# Patient Record
Sex: FEMALE | Race: WHITE | NOT HISPANIC OR LATINO | Employment: UNEMPLOYED | URBAN - METROPOLITAN AREA
[De-identification: names, ages, dates, MRNs, and addresses within clinical notes are randomized per-mention and may not be internally consistent; named-entity substitution may affect disease eponyms.]

---

## 2017-10-17 ENCOUNTER — ALLSCRIPTS OFFICE VISIT (OUTPATIENT)
Dept: OTHER | Facility: OTHER | Age: 62
End: 2017-10-17

## 2017-10-21 NOTE — PROGRESS NOTES
Assessment  1  Encounter for gynecological examination without abnormal finding (V72 31) (Z01 419)    Plan  Encounter for gynecological examination without abnormal finding    · Follow-up visit in 1 year Evaluation and Treatment  Follow-up  Status: Complete  Done:  25UUA1659  Ordered; For: Encounter for gynecological examination without abnormal finding;    Ordered By: Shalonda Briscoe  Performed:   Due: 62CFX2518; Last   Updated By: Minh Schwartz; 10/17/2017 10:43:46 AM  Encounter for screening mammogram for malignant neoplasm of breast    · * MAMMO SCREENING BILATERAL W CAD; Status:Active; Requested SKX:[id number];   Perform:[facility]; ZTQ:20SZZ0565; Last Updated By:Paula Light;   10/17/2017 10:43:46 AM;Ordered; For:Encounter for screening mammogram for   malignant neoplasm of breast; Ordered By:Aniceto Nails; Health Maintenance    · 1 - Barbara Garland (Gastroenterology) Co-Management  *  Status: Active   Requested for: 31RHF3331  Ordered; For: Health Maintenance;  Ordered By: Shalonda Briscoe  Performed:   Due:   33RVW6656  () Care Summary provided  : Yes    Discussion/Summary  healthy adult female the risks and benefits of cervical cancer screening were discussed cervical cancer screening is current Breast cancer screening: the risks and benefits of breast cancer screening were discussed, monthly self breast exam was advised and mammogram has been ordered  Colorectal cancer screening: the risks and benefits of colorectal cancer screening were discussed and colorectal cancer screening is current  Advice and education were given regarding weight bearing exercise, calcium supplements and vitamin D supplements  Chief Complaint  Pt is here for her yearly exam, she has no complaints  History of Present Illness  HPI: 63year old white female here for yearly exam, no complaints  She is postmenopausal and has had no bleeding  She remains on PremPro every 3-4 days with good results   We discussed discontinuing it at this point and seeing how she does  has still never had a colonoscopy  She was strongly urged to schedule this ASAP  GYN HM, Adult Female [facility]: The patient is being seen for a health maintenance evaluation  The last health maintenance visit was 1 year(s) ago  General Health: The patient's health since the last visit is described as good  Lifestyle:  She consumes a diverse and healthy diet  -- She does not have any weight concerns  -- She exercises regularly  She exercises less than three times a week  -- She does not use tobacco -- She consumes alcohol  She reports occasional alcohol use  -- She denies drug use  Reproductive health: the patient is postmenopausal--   she is sexually active  -- pregnancy history: G 2P 2,-- 2    Screening: Cervical cancer screening includes a pap smear performed 10/8/2015,neg  -- and-- human papilloma virus screening performed 10/8/2015,neg  Breast cancer screening includes a mammogram performed 10/24/2016, WNL  Colorectal cancer screening includes no previous colonoscopy  Active Problems  1  Encounter for gynecological examination without abnormal finding (V72 31) (Z01 419)  2  Encounter for screening mammogram for malignant neoplasm of breast (V76 12)   (Z12 31)  3  Menopause (627 2) (Z78 0)    Past Medical History   · History of Encounter for routine gynecological examination (V72 31) (Z01 419)   · History of Hot flashes (627 2) (N95 1)    Surgical History   · History of Foot Surgery   · History of Shoulder Surgery    Family History  Mother    · Family history of malignant neoplasm (V16 9) (Z80 9)  Father    · Family history of cardiac disorder (V17 49) (Z80 55)    Social History   · Currently sexually active   · Daily caffeinated coffee consumption   · Exercises regularly   ·    · Never a smoker   · Social alcohol use (Z78 9)    Current Meds  1  Biotin TABS; Therapy: (Recorded:55Txn5231) to Recorded  2   Multiple Vitamins TABS; Therapy: (Recorded:53Vjb9038) to Recorded  3  Prempro 0 3-1 5 MG Oral Tablet; Take 1 daily; Therapy: 51ZZE6409 to (Last XW:05BBQ6489)  Requested for: 43ZNX1514 Ordered  4  Vitamin B-1 TABS; Therapy: (Recorded:39Xyf4456) to Recorded  5  Vitamin B-12 LIQD;   Therapy: (Recorded:31Yvs3848) to Recorded  6  Vitamin C TABS; Therapy: (Recorded:71Xqc0887) to Recorded  7  Vitamin E 400 UNIT Oral Capsule; Therapy: (Recorded:56Coy4291) to Recorded    Allergies  1  No Known Drug Allergies    Vitals   Recorded: 82VMR6039 08:33JH   Systolic 026, LUE, Sitting   Diastolic 80, LUE, Sitting   Weight 148 lb    BMI Calculated 23 89   BSA Calculated 1 76   LMP      Physical Exam    Constitutional   General appearance: No acute distress, well appearing and well nourished  Neck   Neck: Normal, supple, trachea midline, no masses  Genitourinary   External genitalia: Normal and no lesions appreciated  Vagina: Normal, no lesions or dryness appreciated  Urethra: Normal     Urethral meatus: Normal     Bladder: Normal, soft, non-tender and no prolapse or masses appreciated  Cervix: Normal, no palpable masses  Uterus: Normal, non-tender, not enlarged, and no palpable masses  Adnexa/parametria: Normal, non-tender and no fullness or masses appreciated  Chest   Breasts: Normal and no dimpling or skin changes noted  Abdomen   Abdomen: Normal, non-tender, and no organomegaly noted  Lymphatic   Palpation of lymph nodes in neck, axillae, groin and/or other locations: No lymphadenopathy or masses noted         Future Appointments    Date/Time Provider Specialty Site   10/18/2018 09:20 AM Tara Irwin [facility] Obstetrics/Gynecology [facility] OB     Signatures   Electronically signed by : Noemi Gruber [facility]; Oct 17 2017  9:58AM EST                       (Author)    Electronically signed by : SAMMIE Gregg ; Oct 20 2017 11:06PM EST                       (Author)

## 2017-10-23 ENCOUNTER — GENERIC CONVERSION - ENCOUNTER (OUTPATIENT)
Dept: OTHER | Facility: OTHER | Age: 62
End: 2017-10-23

## 2017-10-24 DIAGNOSIS — Z12.31 ENCOUNTER FOR SCREENING MAMMOGRAM FOR MALIGNANT NEOPLASM OF BREAST: ICD-10-CM

## 2017-10-27 ENCOUNTER — CONVERSION ENCOUNTER (OUTPATIENT)
Dept: RADIOLOGY | Facility: IMAGING CENTER | Age: 62
End: 2017-10-27

## 2017-10-27 ENCOUNTER — GENERIC CONVERSION - ENCOUNTER (OUTPATIENT)
Dept: OTHER | Facility: OTHER | Age: 62
End: 2017-10-27

## 2017-10-30 ENCOUNTER — GENERIC CONVERSION - ENCOUNTER (OUTPATIENT)
Dept: OTHER | Facility: OTHER | Age: 62
End: 2017-10-30

## 2017-12-13 ENCOUNTER — GENERIC CONVERSION - ENCOUNTER (OUTPATIENT)
Dept: OTHER | Facility: OTHER | Age: 62
End: 2017-12-13

## 2018-01-11 NOTE — MISCELLANEOUS
Message  GI Reminder Recall [facility]:   Date: 10/30/2017   Dear Leydi Ma:     Review of our records shows you are due for the following: Colonoscopy  Our records indicate that you are due at this time to have a follow-up examination for a colonoscopy  As you now, these tests are done to prevent colon cancer, a very common disease in the [place] and responsible for the thousands of patient deaths each year  We at [facility] are concerned for your health, and would very much appreciate you getting in touch with us at your earliest convenience, Again, this examination is vital to your proper health maintenance and for the prevention of cancer  Please call the following office to schedule your appointment:   [address], [address], [place], [address] [facility] [phone]  Please call our office to schedule Thank you     We look forward to hearing from you!      Sincerely,         Signatures   Electronically signed by : Alesia Hanson, ; Oct 30 2017 10:39AM EST                       (Author)

## 2018-01-13 VITALS — BODY MASS INDEX: 26.22 KG/M2 | DIASTOLIC BLOOD PRESSURE: 80 MMHG | WEIGHT: 148 LBS | SYSTOLIC BLOOD PRESSURE: 128 MMHG

## 2018-01-13 NOTE — MISCELLANEOUS
Message   Recorded as Task   Date: 06/29/2016 01:51 PM, Created By: Yulia Pereira   Task Name: Med Renewal Request   Assigned To: Mariela Mendez   Regarding Patient: Helen Prader, Status: Active   Comment:    Zoe Cobb - 29 Jun 2016 1:51 PM     TASK CREATED  Caller: Self; Renew Medication; [phone] (Home)  needs refills on Prempro 28-0 3/1 5 sent to [facility]  pt  at [phone]  Marla Monique - 29 Jun 2016 1:56 PM     TASK REASSIGNED: Previously Assigned To Fay Pompa - 29 Jun 2016 1:57 PM     TASK EDITED  [facility] in sept        Active Problems    1  Encounter for gynecological examination without abnormal finding (V72 31) (Z01 419)   2  Encounter for routine gynecological examination (V72 31) (Z01 419)   3  Encounter for screening mammogram for malignant neoplasm of breast (V76 12)   (Z12 31)   4  Menopause (627 2) (Z78 0)   5  Screening for HPV (human papillomavirus) (V73 81) (Z11 51)    Current Meds   1  Biotin TABS; Therapy: (Recorded:73Wpw6200) to Recorded   2  Multiple Vitamins TABS; Therapy: (Recorded:11Sdq8390) to Recorded   3  Prempro 0 3-1 5 MG Oral Tablet; Take 1 daily; Therapy: 42JEU0061 to (Evaluate:84Lus4914)  Requested for: 08SPM3707; Last   Rx:08Jan2016 Ordered   4  Vitamin B-1 TABS; Therapy: (Recorded:07Llz5204) to Recorded   5  Vitamin B-12 LIQD;   Therapy: (Recorded:29Wtt9151) to Recorded   6  Vitamin C TABS; Therapy: (Recorded:40Vbp5651) to Recorded   7  Vitamin E 400 UNIT Oral Capsule; Therapy: (Recorded:45Tdc2992) to Recorded    Allergies    1  No Known Drug Allergies    Plan  Menopause    · Prempro 0 3-1 5 MG Oral Tablet;  Take 1 daily    Signatures   Electronically signed by : Susana German, ; Jun 29 2016  1:58PM EST                       (Author)

## 2018-01-15 NOTE — MISCELLANEOUS
Message  GI Reminder Recall [facility] Lot:   Date: 10/23/2017   Dear Araceli Biswas:     Review of our records shows you are due for the following: Colonoscopy  Our records indicate that you are due at this time to have a follow-up examination for a colonoscopy  As you now, these tests are done to prevent colon cancer, a very common disease in the [place] and responsible for the thousands of patient deaths each year  We at [facility] are concerned for your health, and would very much appreciate you getting in touch with us at your earliest convenience, Again, this examination is vital to your proper health maintenance and for the prevention of cancer  We have attempted to reach you and have been unsuccessful  Please contact our office to schedule your procedure  Thank You       Please call the following office to schedule your appointment:   [address], [address], [place], [address] [facility] [phone]  We look forward to hearing from you!      Sincerely,         Signatures   Electronically signed by : Larisa Subramanian, ; Oct 23 2017  7:32AM EST                       (Author)

## 2018-01-23 NOTE — MISCELLANEOUS
Message   Recorded as Task   Date: 12/12/2017 03:14 PM, Created By: Ethel Murguia   Task Name: Medical Complaint Callback   Assigned To: Faustina Lee   Regarding Patient: Telma Montemayor, Status: In Progress   Shameka Aly - 12 Dec 2017 3:14 PM     TASK CREATED  Caller: Self; Medical Complaint; [phone] (Home); [phone] (Work)  pt stated ever since she has been off her hormone medication she hasn't been been sleeping  and her menopause has gotton worst she would like to know what to do, pt's # [phone] or cell [phone]  Ashley Li - 12 Dec 2017 3:20 PM     TASK IN PROGRESS   Ashley Li - 12 Dec 2017 3:27 PM     TASK EDITED  spoke with pt , since d/c prempro in oct, is absolutely miserable    insomnia, heat flashes   , jumping out of her skin    states she can't function  awaits further instructions from Chance Hinojosa - 12 Dec 2017 3:27 PM     TASK REASSIGNED: Previously Assigned To Priyank Deshpande - 13 Dec 2017 8:11 AM     TASK REPLIED TO: Previously Assigned To Karson French  She had been doing well on one pill every 3-4 days - she can go back on that if she desires   Kaylen Odonnell - 13 Dec 2017 8:53 AM     TASK EDITED  Spoke with pt - she will take the Prempro  Refilled order for her  If patient feels she needs an appointment she will call back to make it  Active Problems    1  Encounter for gynecological examination without abnormal finding (V72 31) (Z01 419)   2  Encounter for screening mammogram for malignant neoplasm of breast (V76 12)   (Z12 31)   3  Menopause (627 2) (Z78 0)    Current Meds   1  Biotin TABS; Therapy: (Recorded:64Xqv4370) to Recorded   2  Multiple Vitamins TABS; Therapy: (Recorded:94Bmm9947) to Recorded   3  Prempro 0 3-1 5 MG Oral Tablet; Take 1 daily; Therapy: 77ECR9585 to (Last HO:37ZPI9997)  Requested for: 79LWF1325 Ordered   4  Vitamin B-1 TABS; Therapy: (Recorded:55Trh8118) to Recorded   5   Vitamin B-12 LIQD;   Therapy: (ALFHNVJT:92ZBZ4178) to Recorded   6  Vitamin C TABS; Therapy: (Recorded:85Bac2565) to Recorded   7  Vitamin E 400 UNIT Oral Capsule; Therapy: (Recorded:99Maj4624) to Recorded    Allergies    1  No Known Drug Allergies    Plan  Menopause    · Prempro 0 3-1 5 MG Oral Tablet;  Take 1 daily    Signatures   Electronically signed by : Paxton Rivera, ; Dec 13 2017  8:53AM EST                       (Author)

## 2018-01-23 NOTE — MISCELLANEOUS
Message   Recorded as Task   Date: 12/12/2017 03:14 PM, Created By: Ford Olguin   Task Name: Medical Complaint Callback   Assigned To: Raquel Horn   Regarding Patient: Myles Garcia, Status: In Progress   CommentUlus Drivers - 12 Dec 2017 3:14 PM     TASK CREATED  Caller: Self; Medical Complaint; [phone] (Home); [phone] (Work)  pt stated ever since she has been off her hormone medication she hasn't been been sleeping  and her menopause has gotton worst she would like to know what to do, pt's # [phone] or cell [phone]  Ashley Li - 12 Dec 2017 3:20 PM     TASK IN PROGRESS   Ashley Li - 12 Dec 2017 3:27 PM     TASK EDITED  spoke with pt , since d/c prempro in oct, is absolutely miserable    insomnia, heat flashes   , jumping out of her skin    states she can't function  awaits further instructions from yadiel        Active Problems    1  Encounter for gynecological examination without abnormal finding (V72 31) (Z01 419)   2  Encounter for screening mammogram for malignant neoplasm of breast (V76 12)   (Z12 31)   3  Menopause (627 2) (Z78 0)    Current Meds   1  Biotin TABS; Therapy: (Recorded:19Kzi8539) to Recorded   2  Multiple Vitamins TABS; Therapy: (Recorded:50Qti5730) to Recorded   3  Prempro 0 3-1 5 MG Oral Tablet; Take 1 daily; Therapy: 65BXX6133 to (Last FV:89UMD9253)  Requested for: 79QAE2686 Ordered   4  Vitamin B-1 TABS; Therapy: (Recorded:79Szt4816) to Recorded   5  Vitamin B-12 LIQD;   Therapy: (Recorded:33Gqn7796) to Recorded   6  Vitamin C TABS; Therapy: (Recorded:25Nel0265) to Recorded   7  Vitamin E 400 UNIT Oral Capsule; Therapy: (Recorded:84Ktb3428) to Recorded    Allergies    1   No Known Drug Allergies    Signatures   Electronically signed by : Timoteo Roger, ; Dec 12 2017  3:27PM EST                       (Author)

## 2018-03-13 ENCOUNTER — TELEPHONE (OUTPATIENT)
Dept: OBGYN CLINIC | Facility: CLINIC | Age: 63
End: 2018-03-13

## 2018-10-09 ENCOUNTER — TELEPHONE (OUTPATIENT)
Dept: OBGYN CLINIC | Facility: CLINIC | Age: 63
End: 2018-10-09

## 2018-10-09 NOTE — TELEPHONE ENCOUNTER
ROSIBEL CALLED REGARDING HER NEW INSURANCE, CARDINAL TATO Devi ON THE INSURANCE CARD IT DOES STATE MULTI-PLAN    SHE WAS WONDERING HOW MUCH AN OFFICE VISIT (YEARLY) WOULD COST IF WE DID NOT TAKE HER INSURANCE, I QUOTED HER BETWEEN [id number]    SHE WAS WONDERING WHAT HER CO-PAY MIGHT BE AND I SAID SHE MIGHT WANT TO CALL HER INSURANCE BUT SHE SAID SHE DID AND THEY TOLD HER TO CALL HER DR OFFICE    SHE HAS HER YEARLY APPOINTMENT WITH [facility] ON 10/18/18  Neri Devi

## 2018-10-18 ENCOUNTER — ANNUAL EXAM (OUTPATIENT)
Dept: OBGYN CLINIC | Facility: CLINIC | Age: 63
End: 2018-10-18
Payer: COMMERCIAL

## 2018-10-18 VITALS
SYSTOLIC BLOOD PRESSURE: 120 MMHG | DIASTOLIC BLOOD PRESSURE: 74 MMHG | WEIGHT: 148 LBS | HEIGHT: 63 IN | BODY MASS INDEX: 26.22 KG/M2

## 2018-10-18 DIAGNOSIS — Z12.31 ENCOUNTER FOR SCREENING MAMMOGRAM FOR MALIGNANT NEOPLASM OF BREAST: ICD-10-CM

## 2018-10-18 DIAGNOSIS — Z01.419 ENCNTR FOR GYN EXAM (GENERAL) (ROUTINE) W/O ABN FINDINGS: ICD-10-CM

## 2018-10-18 PROCEDURE — 99396 PREV VISIT EST AGE 40-64: CPT | Performed by: PHYSICIAN ASSISTANT

## 2018-10-18 RX ORDER — BIOTIN 10 MG
TABLET ORAL
COMMUNITY

## 2018-10-18 NOTE — PROGRESS NOTES
Jodi Dickinson   1955    CC:  Yearly exam    S:  66 y o  female here for yearly exam  She is postmenopausal and has had no vaginal bleeding  She denies vaginal discharge, itching, odor or dryness  She is sexually active  She continues to use PremPro 0 3/1 5 every 4 days  She is going to try cutting it back to every 5th day  She is now retired and remains very busy  She is currently dog-sitting her son's dog  Last Pap 10/8/15 neg/neg  Last Mammo 10/27/17 neg  Last Colonoscopy never - did Cologuard    Current Outpatient Prescriptions:     Biotin 10 MG TABS, Take by mouth, Disp: , Rfl:     estrogen, conjugated,-medroxyprogesterone (PREMPRO) 0 3-1 5 MG per tablet, Take by mouth daily, Disp: , Rfl:     Lactobacillus Acidophilus POWD, Take by mouth, Disp: , Rfl:     Multiple Vitamins-Minerals (PREVENT PO), Take by mouth, Disp: , Rfl:     Omega-3 Fatty Acids (FISH OIL PO), Take 2 g by mouth, Disp: , Rfl:   Social History     Social History    Marital status: /Civil Union     Spouse name: N/A    Number of children: N/A    Years of education: N/A     Occupational History    Not on file  Social History Main Topics    Smoking status: Never Smoker    Smokeless tobacco: Never Used    Alcohol use Yes      Comment: occ    Drug use: No    Sexual activity: Yes     Partners: Male     Other Topics Concern    Not on file     Social History Narrative    No narrative on file     Family History   Problem Relation Age of Onset    Heart disease Mother     Hypertension Mother     Ovarian cancer Mother     Heart attack Father      History reviewed  No pertinent past medical history  O:  Blood pressure 120/74, height 5' 2 6" (1 59 m), weight 67 1 kg (148 lb)  Patient appears well and is not in distress  Neck is supple without masses  Breasts are symmetrical without mass, tenderness, nipple discharge, skin changes or adenopathy  Abdomen is soft and nontender without masses     External genitals are normal without lesions or rashes  Vagina is normal without discharge or bleeding  Cervix is normal without discharge or lesion  Uterus is normal, mobile, nontender without palpable mass  Adnexa are normal, nontender, without palpable mass  A:  Yearly exam      P:   Pap 2020   Mammo slip given   RTO one year for yearly exam or sooner as needed

## 2018-10-31 ENCOUNTER — HOSPITAL ENCOUNTER (OUTPATIENT)
Dept: RADIOLOGY | Facility: IMAGING CENTER | Age: 63
Discharge: HOME | End: 2018-10-31
Payer: COMMERCIAL

## 2018-10-31 DIAGNOSIS — Z12.31 ENCOUNTER FOR SCREENING MAMMOGRAM FOR MALIGNANT NEOPLASM OF BREAST: ICD-10-CM

## 2018-10-31 PROCEDURE — 77067 SCR MAMMO BI INCL CAD: CPT

## 2019-10-29 NOTE — PROGRESS NOTES
Arabella Dickinson   1955    CC:  Yearly exam    S:  60 y o  female here for yearly exam  She is postmenopausal and has had no vaginal bleeding  She denies vaginal discharge, itching, odor or dryness  Sexual activity: She is sexually active without pain, bleeding or dryness  She has terrible hot flashes and night sweats since stopping her PremPro last year when her insurance changed and it started to cost $260/month  She is now using a supplement called Amberen that she found on the internet  She will let me know if it doesn't help in the next few months  Last Pap: 10/8/15 neg/neg  Last Mammo: 10/31/18 neg  Last Colonoscopy: FIT 2018  Last DEXA: never    We reviewed [facility] guidelines for Pap testing       Family hx of breast cancer: no  Family hx of ovarian cancer:  mother  Family hx of colon cancer: no    Current Outpatient Medications:     Biotin 10 MG TABS, Take by mouth, Disp: , Rfl:     Omega-3 Fatty Acids (FISH OIL PO), Take 2 g by mouth, Disp: , Rfl:     vitamin E 100 UNIT capsule, Take 100 Units by mouth daily, Disp: , Rfl:     estrogen, conjugated,-medroxyprogesterone (PREMPRO) 0 3-1 5 MG per tablet, Take by mouth daily, Disp: , Rfl:   Social History     Socioeconomic History    Marital status: /Civil Union     Spouse name: Not on file    Number of children: Not on file    Years of education: Not on file    Highest education level: Not on file   Occupational History    Not on file   Social Needs    Financial resource strain: Not on file    Food insecurity:     Worry: Not on file     Inability: Not on file    Transportation needs:     Medical: Not on file     Non-medical: Not on file   Tobacco Use    Smoking status: Never Smoker    Smokeless tobacco: Never Used   Substance and Sexual Activity    Alcohol use: Yes     Comment: occ    Drug use: No    Sexual activity: Yes     Partners: Male   Lifestyle    Physical activity:     Days per week: Not on file     Minutes per session: Not on file    Stress: Not on file   Relationships    Social connections:     Talks on phone: Not on file     Gets together: Not on file     Attends Orthodoxy service: Not on file     Active member of club or organization: Not on file     Attends meetings of clubs or organizations: Not on file     Relationship status: Not on file    Intimate partner violence:     Fear of current or ex partner: Not on file     Emotionally abused: Not on file     Physically abused: Not on file     Forced sexual activity: Not on file   Other Topics Concern    Not on file   Social History Narrative    Not on file     Family History   Problem Relation Age of Onset    Heart disease Mother     Hypertension Mother     Ovarian cancer Mother     Heart attack Father      History reviewed  No pertinent past medical history  Review of Systems   Respiratory: Negative  Cardiovascular: Negative  Gastrointestinal: Negative for constipation and diarrhea  Genitourinary: Negative for difficulty urinating, pelvic pain, vaginal bleeding, vaginal discharge, itching or odor  O:  Blood pressure 110/72, height 5' 2 21" (1 58 m), weight 66 2 kg (146 lb)  Patient appears well and is not in distress  Neck is supple without masses  Breasts are symmetrical without mass, tenderness, nipple discharge, skin changes or adenopathy  Abdomen is soft and nontender without masses  External genitals are normal without lesions or rashes  Urethral meatus and urethra are normal  Bladder is normal to palpation  Vagina is normal without discharge or bleeding  Cervix is normal without discharge or lesion  Uterus is normal, mobile, nontender without palpable mass  Adnexa are normal, nontender, without palpable mass  A:  Yearly exam      P:   Pap and HPV today   Mammo slip given   Colon cancer screening due now    RTO one year for yearly exam or sooner as needed

## 2019-10-30 ENCOUNTER — ANNUAL EXAM (OUTPATIENT)
Dept: OBGYN CLINIC | Facility: CLINIC | Age: 64
End: 2019-10-30
Payer: COMMERCIAL

## 2019-10-30 VITALS
SYSTOLIC BLOOD PRESSURE: 110 MMHG | WEIGHT: 146 LBS | DIASTOLIC BLOOD PRESSURE: 72 MMHG | BODY MASS INDEX: 26.87 KG/M2 | HEIGHT: 62 IN

## 2019-10-30 DIAGNOSIS — Z12.31 ENCOUNTER FOR SCREENING MAMMOGRAM FOR MALIGNANT NEOPLASM OF BREAST: ICD-10-CM

## 2019-10-30 DIAGNOSIS — Z01.419 ENCNTR FOR GYN EXAM (GENERAL) (ROUTINE) W/O ABN FINDINGS: ICD-10-CM

## 2019-10-30 PROCEDURE — 99396 PREV VISIT EST AGE 40-64: CPT | Performed by: PHYSICIAN ASSISTANT

## 2019-11-12 ENCOUNTER — HOSPITAL ENCOUNTER (OUTPATIENT)
Dept: RADIOLOGY | Facility: IMAGING CENTER | Age: 64
Discharge: HOME | End: 2019-11-12
Payer: COMMERCIAL

## 2019-11-12 VITALS — WEIGHT: 146 LBS | BODY MASS INDEX: 25.87 KG/M2 | HEIGHT: 63 IN

## 2019-11-12 DIAGNOSIS — Z12.31 ENCOUNTER FOR SCREENING MAMMOGRAM FOR MALIGNANT NEOPLASM OF BREAST: ICD-10-CM

## 2019-11-12 PROCEDURE — 77067 SCR MAMMO BI INCL CAD: CPT

## 2020-10-26 ENCOUNTER — TRANSCRIBE ORDERS (OUTPATIENT)
Dept: ADMINISTRATIVE | Facility: HOSPITAL | Age: 65
End: 2020-10-26

## 2020-10-26 DIAGNOSIS — Z12.31 ENCOUNTER FOR SCREENING MAMMOGRAM FOR MALIGNANT NEOPLASM OF BREAST: Primary | ICD-10-CM

## 2020-11-04 ENCOUNTER — ANNUAL EXAM (OUTPATIENT)
Dept: OBGYN CLINIC | Facility: CLINIC | Age: 65
End: 2020-11-04
Payer: COMMERCIAL

## 2020-11-04 VITALS
DIASTOLIC BLOOD PRESSURE: 80 MMHG | WEIGHT: 155 LBS | TEMPERATURE: 98.7 F | HEIGHT: 63 IN | SYSTOLIC BLOOD PRESSURE: 128 MMHG | BODY MASS INDEX: 27.46 KG/M2

## 2020-11-04 DIAGNOSIS — Z01.419 ENCNTR FOR GYN EXAM (GENERAL) (ROUTINE) W/O ABN FINDINGS: ICD-10-CM

## 2020-11-04 DIAGNOSIS — Z12.31 ENCOUNTER FOR SCREENING MAMMOGRAM FOR MALIGNANT NEOPLASM OF BREAST: ICD-10-CM

## 2020-11-04 PROCEDURE — 99396 PREV VISIT EST AGE 40-64: CPT | Performed by: PHYSICIAN ASSISTANT

## 2020-11-04 PROCEDURE — G0145 SCR C/V CYTO,THINLAYER,RESCR: HCPCS | Performed by: PHYSICIAN ASSISTANT

## 2020-11-04 PROCEDURE — 87624 HPV HI-RISK TYP POOLED RSLT: CPT | Performed by: PHYSICIAN ASSISTANT

## 2020-11-04 RX ORDER — ZINC GLUCONATE 50 MG
50 TABLET ORAL DAILY
COMMUNITY

## 2020-11-05 LAB
HPV HR 12 DNA CVX QL NAA+PROBE: NEGATIVE
HPV16 DNA CVX QL NAA+PROBE: NEGATIVE
HPV18 DNA CVX QL NAA+PROBE: NEGATIVE

## 2020-11-10 LAB
LAB AP GYN PRIMARY INTERPRETATION: NORMAL
Lab: NORMAL

## 2020-11-13 ENCOUNTER — HOSPITAL ENCOUNTER (OUTPATIENT)
Dept: RADIOLOGY | Facility: IMAGING CENTER | Age: 65
Discharge: HOME | End: 2020-11-13
Payer: COMMERCIAL

## 2020-11-13 VITALS — BODY MASS INDEX: 28.52 KG/M2 | WEIGHT: 155 LBS | HEIGHT: 62 IN

## 2020-11-13 DIAGNOSIS — Z12.31 ENCOUNTER FOR SCREENING MAMMOGRAM FOR MALIGNANT NEOPLASM OF BREAST: ICD-10-CM

## 2020-11-13 PROCEDURE — 77067 SCR MAMMO BI INCL CAD: CPT

## 2021-04-13 DIAGNOSIS — Z23 ENCOUNTER FOR IMMUNIZATION: ICD-10-CM

## 2021-11-26 ENCOUNTER — OFFICE VISIT (OUTPATIENT)
Dept: URGENT CARE | Age: 66
End: 2021-11-26
Payer: COMMERCIAL

## 2021-11-26 VITALS
TEMPERATURE: 98.1 F | RESPIRATION RATE: 18 BRPM | HEART RATE: 103 BPM | WEIGHT: 150 LBS | OXYGEN SATURATION: 95 % | HEIGHT: 63 IN | BODY MASS INDEX: 26.58 KG/M2

## 2021-11-26 DIAGNOSIS — J34.89 SINUS PRESSURE: Primary | ICD-10-CM

## 2021-11-26 PROCEDURE — U0005 INFEC AGEN DETEC AMPLI PROBE: HCPCS | Performed by: PREVENTIVE MEDICINE

## 2021-11-26 PROCEDURE — U0003 INFECTIOUS AGENT DETECTION BY NUCLEIC ACID (DNA OR RNA); SEVERE ACUTE RESPIRATORY SYNDROME CORONAVIRUS 2 (SARS-COV-2) (CORONAVIRUS DISEASE [COVID-19]), AMPLIFIED PROBE TECHNIQUE, MAKING USE OF HIGH THROUGHPUT TECHNOLOGIES AS DESCRIBED BY CMS-2020-01-R: HCPCS | Performed by: PREVENTIVE MEDICINE

## 2021-11-26 PROCEDURE — 99213 OFFICE O/P EST LOW 20 MIN: CPT | Performed by: PREVENTIVE MEDICINE

## 2021-11-26 RX ORDER — AZITHROMYCIN 250 MG/1
TABLET, FILM COATED ORAL
Qty: 6 TABLET | Refills: 0 | Status: SHIPPED | OUTPATIENT
Start: 2021-11-26 | End: 2021-11-30

## 2021-11-26 RX ORDER — GENTAMICIN SULFATE 3 MG/ML
1 SOLUTION/ DROPS OPHTHALMIC EVERY 4 HOURS
Qty: 5 ML | Refills: 0 | Status: SHIPPED | OUTPATIENT
Start: 2021-11-26

## 2021-11-26 RX ORDER — GENTAMICIN SULFATE 3 MG/ML
1 SOLUTION/ DROPS OPHTHALMIC EVERY 4 HOURS
Qty: 5 ML | Refills: 0 | Status: SHIPPED | OUTPATIENT
Start: 2021-11-26 | End: 2021-11-26

## 2021-11-26 RX ORDER — AZITHROMYCIN 250 MG/1
TABLET, FILM COATED ORAL
Qty: 6 TABLET | Refills: 0 | Status: SHIPPED | OUTPATIENT
Start: 2021-11-26 | End: 2021-11-26

## 2021-11-27 LAB — SARS-COV-2 RNA RESP QL NAA+PROBE: NEGATIVE

## 2022-10-04 ENCOUNTER — APPOINTMENT (OUTPATIENT)
Dept: RADIOLOGY | Age: 67
End: 2022-10-04
Payer: COMMERCIAL

## 2022-10-04 ENCOUNTER — OFFICE VISIT (OUTPATIENT)
Dept: URGENT CARE | Age: 67
End: 2022-10-04
Payer: COMMERCIAL

## 2022-10-04 VITALS
OXYGEN SATURATION: 98 % | TEMPERATURE: 96.7 F | HEART RATE: 79 BPM | SYSTOLIC BLOOD PRESSURE: 118 MMHG | RESPIRATION RATE: 18 BRPM | DIASTOLIC BLOOD PRESSURE: 74 MMHG

## 2022-10-04 DIAGNOSIS — M79.672 LEFT FOOT PAIN: Primary | ICD-10-CM

## 2022-10-04 DIAGNOSIS — M79.672 LEFT FOOT PAIN: ICD-10-CM

## 2022-10-04 PROCEDURE — 73630 X-RAY EXAM OF FOOT: CPT

## 2022-10-04 PROCEDURE — 99213 OFFICE O/P EST LOW 20 MIN: CPT

## 2022-10-04 NOTE — PROGRESS NOTES
[address] [facility]        NAME: Laura Wilkes is a 74 y o  female  : 1955    MRN: [id number]  DATE: 2022  TIME: 10:44 AM    Assessment and Plan   Left foot pain [M79 672]  1  Left foot pain  XR foot 3+ vw left         Patient Instructions     XR prelim reading: no acute fractures  Degenerative changes 1st MTP joint  Recommend NSAID  Follow up with Podiatry or PCP  Established with podiatry from bunionectomy of R foot  Follow up with PCP in 2-3 days  Proceed to ER if symptoms worsen  Chief Complaint     Chief Complaint   Patient presents with    Foot Pain     Left foot pains primarily at night  Unable to move toes  Unable to lift toes  Pain occasionally during the day, but mostly at night  History of Present Illness     74 y o  F presents with complaint of L foot pain x 1 month  Denies trauma or injury  Denies prior surgery  States pain is worse at night  Has not taken anything OTC for pain  Denies numbness or tingling  Requesting XR  Review of Systems   Review of Systems   Constitutional: Negative for chills, fatigue and fever  HENT: Negative for congestion, ear pain, facial swelling, hearing loss, rhinorrhea, sinus pressure, sneezing, sore throat and trouble swallowing  Eyes: Negative for pain, redness and visual disturbance  Respiratory: Negative for cough, chest tightness, shortness of breath and wheezing  Cardiovascular: Negative for chest pain and palpitations  Gastrointestinal: Negative for abdominal pain, diarrhea, nausea and vomiting  Genitourinary: Negative for dysuria, flank pain, hematuria and pelvic pain  Musculoskeletal: Positive for arthralgias  Negative for back pain, joint swelling, myalgias, neck pain and neck stiffness  Skin: Negative for color change, rash and wound  Neurological: Negative for dizziness, seizures, syncope, weakness, light-headedness, numbness and headaches     Psychiatric/Behavioral: Negative for confusion, hallucinations and sleep disturbance  The patient is not nervous/anxious  All other systems reviewed and are negative  Current Medications       Current Outpatient Medications:     Biotin 10 MG TABS, Take by mouth, Disp: , Rfl:     gentamicin (GARAMYCIN) 0 3 % ophthalmic solution, Administer 1 drop to the right eye every 4 (four) hours, Disp: 5 mL, Rfl: 0    Nutritional Supplements (VITAMIN D BOOSTER PO), Take by mouth, Disp: , Rfl:     Omega-3 Fatty Acids (FISH OIL PO), Take 2 g by mouth, Disp: , Rfl:     vitamin E 100 UNIT capsule, Take 100 Units by mouth daily, Disp: , Rfl:     zinc gluconate 50 mg tablet, Take 50 mg by mouth daily, Disp: , Rfl:     Current Allergies     Allergies as of 10/04/2022    (No Known Allergies)            The following portions of the patient's history were reviewed and updated as appropriate: allergies, current medications, past family history, past medical history, past social history, past surgical history and problem list      History reviewed  No pertinent past medical history  Past Surgical History:   Procedure Laterality Date    FOOT SURGERY      SHOULDER SURGERY         Family History   Problem Relation Age of Onset    Heart disease Mother     Hypertension Mother     Ovarian cancer Mother     Heart attack Father     No Known Problems Sister     No Known Problems Daughter     No Known Problems Maternal Grandmother     No Known Problems Maternal Grandfather     No Known Problems Paternal Grandmother     No Known Problems Paternal Grandfather     No Known Problems Sister     No Known Problems Paternal Aunt     No Known Problems Paternal Aunt     No Known Problems Paternal Aunt     No Known Problems Son          Medications have been verified  Objective   /74   Pulse 79   Temp (!) 96 7 °F (35 9 °C)   Resp 18   SpO2 98%   No LMP recorded  Patient is postmenopausal        Physical Exam     Physical Exam  Vitals reviewed  Constitutional:       General: She is not in acute distress  Appearance: Normal appearance  She is not toxic-appearing  HENT:      Head: Normocephalic  Right Ear: Tympanic membrane normal  Tympanic membrane is not erythematous or bulging  Left Ear: Tympanic membrane normal  Tympanic membrane is not erythematous or bulging  Nose: No congestion or rhinorrhea  Right Sinus: No maxillary sinus tenderness or frontal sinus tenderness  Left Sinus: No maxillary sinus tenderness or frontal sinus tenderness  Mouth/Throat:      Pharynx: Uvula midline  No oropharyngeal exudate, posterior oropharyngeal erythema or uvula swelling  Tonsils: No tonsillar exudate or tonsillar abscesses  Eyes:      Extraocular Movements: Extraocular movements intact  Conjunctiva/sclera: Conjunctivae normal       Pupils: Pupils are equal, round, and reactive to light  Cardiovascular:      Rate and Rhythm: Normal rate and regular rhythm  Pulses: Normal pulses  Heart sounds: Normal heart sounds  Pulmonary:      Effort: Pulmonary effort is normal  No tachypnea or respiratory distress  Breath sounds: Normal breath sounds and air entry  No decreased breath sounds, wheezing, rhonchi or rales  Abdominal:      General: Bowel sounds are normal       Palpations: Abdomen is soft  Tenderness: There is no abdominal tenderness  There is no right CVA tenderness or guarding  Musculoskeletal:         General: Normal range of motion  Cervical back: Normal range of motion  Comments:   Normal gait & station  No tenderness on exam - unable to reproduce pain  FAROM  Neurovasc intact   Lymphadenopathy:      Cervical: No cervical adenopathy  Skin:     General: Skin is warm and dry  Neurological:      General: No focal deficit present  Mental Status: She is alert  Cranial Nerves: Cranial nerves are intact  Sensory: Sensation is intact        Motor: Motor function is intact  Coordination: Coordination is intact  Gait: Gait is intact  Deep Tendon Reflexes: Reflexes are normal and symmetric

## 2023-03-30 ENCOUNTER — EVALUATION (OUTPATIENT)
Dept: PHYSICAL THERAPY | Facility: REHABILITATION | Age: 68
End: 2023-03-30

## 2023-03-30 DIAGNOSIS — Z98.890 S/P COMPLETE REPAIR OF ROTATOR CUFF: ICD-10-CM

## 2023-03-30 DIAGNOSIS — M25.511 ACUTE PAIN OF RIGHT SHOULDER: Primary | ICD-10-CM

## 2023-03-30 RX ORDER — METHOCARBAMOL 500 MG/1
500 TABLET, FILM COATED ORAL 4 TIMES DAILY
COMMUNITY

## 2023-03-30 NOTE — LETTER
2023    Wilmer Ballard MD  [facility]    Patient: Dewayne Vincent   YOB: 1955   Date of Visit: 3/30/2023     Encounter Diagnosis     ICD-10-CM    1  Acute pain of right shoulder  M25 511       2  S/P complete repair of rotator cuff  Z98 890           Dear Dr Kristie Szymanski: Thank you for your recent referral of Dewayne Vincent  Please review the attached evaluation summary from Mily's recent visit  Please verify that you agree with the plan of care by signing the attached order  If you have any questions or concerns, please do not hesitate to call  I sincerely appreciate the opportunity to share in the care of one of your patients and hope to have another opportunity to work with you in the near future  Sincerely,    Wilda Newton, PT      Referring Provider:      I certify that I have read the below Plan of Care and certify the need for these services furnished under this plan of treatment while under my care  Wilmer Ballard MD  [facility] [phone] [id number]  Via Fax: [phone]          PT Evaluation     Today's date: 3/30/2023  Patient name: Dewayne Vincent  : 1955  MRN: [id number]  Referring provider: Riley Mcpherson MD  Dx:   Encounter Diagnosis     ICD-10-CM    1  Acute pain of right shoulder  M25 511       2  S/P complete repair of rotator cuff  Z98 890                      Assessment  Assessment details: Pt is a 74year old right-handed female presenting to PT approximately 6 weeks s/p L shoulder arthroscopy, SAD , RC repair, debride labrum/ synovium/ chondroplasty, release biceps  Pt presents with pain, decreased range of motion, decreased strength, and decreased tolerance to activity  Pt educated in post-operative protocol, self-hygiene, safety precautions, and given HEP  Pt would benefit from skilled physical therapy to address limitations and to achieve goals  Thank you for this referral    Impairments: abnormal coordination, abnormal or restricted ROM, activity intolerance, impaired physical strength and pain with function  Understanding of Dx/Px/POC: good   Prognosis: good    Goals  Short-term goals  1  Pt will decrease pain by 1-2 points within 4 weeks  2  Pt will improve ROM to benchmarks as outlined in surgeon's protocol within 4-6 weeks  3  Pt will be independent in donning/doffing sling within 4 weeks  Long-term goals  1  Pt will be independent in HEP by discharge  2  Pt will be able to reach to shoulder-height shelf with minimal discomfort or scapular compensation in 8 weeks  3  Pt will be able to perform IADLS without pain or compensation in 10 weeks  4  Pt will be able to carry household items/grocery bags with <3/10 pain and without compensation in 12 weeks  Plan  Plan details: Advancement of treatment  Patient would benefit from: PT eval and skilled PT  Planned modality interventions: cryotherapy  Planned therapy interventions: IADL retraining, body mechanics training, flexibility, functional ROM exercises, home exercise program, neuromuscular re-education, manual therapy, postural training, strengthening, stretching, therapeutic activities, therapeutic exercise, activity modification, patient education and self care  Frequency: 2x week  Duration in visits: 20  Duration in weeks: 12        Subjective Evaluation    History of Present Illness  Date of surgery: 2/17/2023  Mechanism of injury: surgery  Mechanism of injury: Pt is a 74year old right-handed female presenting to PT approximately 6 weeks s/p L shoulder arthroscopy, SAD , RC repair, debride labrum/ synovium/ chondroplasty, release biceps  Pt reports she does not recall any specific injury, however, began having pain and difficulty lifting her arm in January  Pt went to Dr Tatiana Roy for further evaluation, x-ray and MRI performed, positive for RTC tear   Pt underwent surgical procedure as "listed above on 2/17/23  Pt placed in standard sling, without abduction pillow  Pt referred to OPPT  Pt is retired, used to work at [employer] as [employer]  Pain Location: left shoulder and upper arm region    Pain Type: constant throbbing, sharp pain with movement    Pain Intensity:  Current: 8  Best: 5  Worst: 8      Pt reports increased pain and/or difficulty with: ADLs, any movement with her left arm  Pt reports she has difficulty falling and staying asleep, states \"I haven't slept for a month  \"    Pt reports decreased pain with: medication, ice            Recurrent probem    Quality of life: good    Treatments  Current treatment: medication  Patient Goals  Patient goals for therapy: decreased pain, increased motion, increased strength and independence with ADLs/IADLs          Objective     Passive Range of Motion   Left Shoulder   Flexion: 75 degrees with pain  Abduction: 65 degrees with pain    Right Shoulder   Normal passive range of motion    Left Elbow   Normal passive range of motion    Right Elbow   Normal passive range of motion    Strength/Myotome Testing     Additional Strength Details  L Shoulder Strength not tested secondary to post-op status and protocol        Flowsheet Rows    Flowsheet Row Most Recent Value   PT/OT G-Codes    Current Score 42   Projected Score 71            Precautions: RTC Protocol    Patient's Goals:      Daily Treatment Diary      Assessment  3/30                     Maryal/Tasia RICHARD                     FOTO  42/71       **         **   Manuals    L Shoulder PROM Per Protocol                                                 Prescribed POC    Pt Education: Protocol, Sling Position, Activity Modification/Restriction, Cryotherapy, Protocol  MD                      HEP Issued/Updated  MD                      Elbow PROM: Flex/Ext/Pro/Sup  1x10 ea                      Pendulums: 4 Way  1x10 ea                      Table Slides: Flex and Scap  NV                      " Putty                         Scap Retract                                                                                                Goal Oriented Functional Activity:                                                Modalities    CP L Shoulder   Post-Tx                                    QR Code:    [facility]:

## 2023-03-30 NOTE — PROGRESS NOTES
PT Evaluation     Today's date: 3/30/2023  Patient name: Radha Lyons  : 1955  MRN: [id number]  Referring provider: Jerome Stevens MD  Dx:   Encounter Diagnosis     ICD-10-CM    1  Acute pain of right shoulder  M25 511       2  S/P complete repair of rotator cuff  Z98 890                      Assessment  Assessment details: Pt is a 74year old right-handed female presenting to PT approximately 6 weeks s/p L shoulder arthroscopy, SAD , RC repair, debride labrum/ synovium/ chondroplasty, release biceps  Pt presents with pain, decreased range of motion, decreased strength, and decreased tolerance to activity  Pt educated in post-operative protocol, self-hygiene, safety precautions, and given HEP  Pt would benefit from skilled physical therapy to address limitations and to achieve goals  Thank you for this referral    Impairments: abnormal coordination, abnormal or restricted ROM, activity intolerance, impaired physical strength and pain with function  Understanding of Dx/Px/POC: good   Prognosis: good    Goals  Short-term goals  1  Pt will decrease pain by 1-2 points within 4 weeks  2  Pt will improve ROM to benchmarks as outlined in surgeon's protocol within 4-6 weeks  3  Pt will be independent in donning/doffing sling within 4 weeks  Long-term goals  1  Pt will be independent in HEP by discharge  2  Pt will be able to reach to shoulder-height shelf with minimal discomfort or scapular compensation in 8 weeks  3  Pt will be able to perform IADLS without pain or compensation in 10 weeks  4  Pt will be able to carry household items/grocery bags with <3/10 pain and without compensation in 12 weeks         Plan  Plan details: Advancement of treatment  Patient would benefit from: PT eval and skilled PT  Planned modality interventions: cryotherapy  Planned therapy interventions: IADL retraining, body mechanics training, flexibility, functional ROM exercises, home exercise program, neuromuscular "re-education, manual therapy, postural training, strengthening, stretching, therapeutic activities, therapeutic exercise, activity modification, patient education and self care  Frequency: 2x week  Duration in visits: 20  Duration in weeks: 12        Subjective Evaluation    History of Present Illness  Date of surgery: 2/17/2023  Mechanism of injury: surgery  Mechanism of injury: Pt is a 74year old right-handed female presenting to PT approximately 6 weeks s/p L shoulder arthroscopy, SAD , RC repair, debride labrum/ synovium/ chondroplasty, release biceps  Pt reports she does not recall any specific injury, however, began having pain and difficulty lifting her arm in January  Pt went to Dr Ro Mora for further evaluation, x-ray and MRI performed, positive for RTC tear  Pt underwent surgical procedure as listed above on 2/17/23  Pt placed in standard sling, without abduction pillow  Pt referred to OPPT  Pt is retired, used to work at [employer] as [employer]  Pain Location: left shoulder and upper arm region    Pain Type: constant throbbing, sharp pain with movement    Pain Intensity:  Current: 8  Best: 5  Worst: 8      Pt reports increased pain and/or difficulty with: ADLs, any movement with her left arm  Pt reports she has difficulty falling and staying asleep, states \"I haven't slept for a month  \"    Pt reports decreased pain with: medication, ice            Recurrent probem    Quality of life: good    Treatments  Current treatment: medication  Patient Goals  Patient goals for therapy: decreased pain, increased motion, increased strength and independence with ADLs/IADLs          Objective     Passive Range of Motion   Left Shoulder   Flexion: 75 degrees with pain  Abduction: 65 degrees with pain    Right Shoulder   Normal passive range of motion    Left Elbow   Normal passive range of motion    Right Elbow   Normal passive range of motion    Strength/Myotome Testing     Additional Strength " Details  L Shoulder Strength not tested secondary to post-op status and protocol        Flowsheet Rows    Flowsheet Row Most Recent Value   PT/OT G-Codes    Current Score 42   Projected Score 71             Precautions: RTC Protocol    Patient's Goals:      Daily Treatment Diary      Assessment  3/30                     Maryal/Tasia RICHARD                     FOTO  42/71       **         **   Manuals    L Shoulder PROM Per Protocol                                                 Prescribed POC    Pt Education: Protocol, Sling Position, Activity Modification/Restriction, Cryotherapy, Protocol  MD                      HEP Issued/Updated  MD                      Elbow PROM: Flex/Ext/Pro/Sup  1x10 ea                      Pendulums: 4 Way  1x10 ea                      Table Slides: Flex and Scap  NV                      Putty                         Scap Retract                                                                                                Goal Oriented Functional Activity:                                                Modalities    CP L Shoulder   Post-Tx                                    QR Code:    [facility] HEP:

## 2023-04-07 ENCOUNTER — OFFICE VISIT (OUTPATIENT)
Dept: PHYSICAL THERAPY | Facility: REHABILITATION | Age: 68
End: 2023-04-07

## 2023-04-07 DIAGNOSIS — M25.511 ACUTE PAIN OF RIGHT SHOULDER: Primary | ICD-10-CM

## 2023-04-07 DIAGNOSIS — Z98.890 S/P COMPLETE REPAIR OF ROTATOR CUFF: ICD-10-CM

## 2023-04-28 ENCOUNTER — OFFICE VISIT (OUTPATIENT)
Dept: PHYSICAL THERAPY | Facility: REHABILITATION | Age: 68
End: 2023-04-28

## 2023-04-28 DIAGNOSIS — M25.511 ACUTE PAIN OF RIGHT SHOULDER: Primary | ICD-10-CM

## 2023-04-28 DIAGNOSIS — Z98.890 S/P COMPLETE REPAIR OF ROTATOR CUFF: ICD-10-CM

## 2023-04-28 NOTE — PROGRESS NOTES
"Daily Note     Today's date: 2023  Patient name: Melony Ghotra  : 1955  MRN: [id number]  Referring provider: Corrinne Roup, MD  Dx:   Encounter Diagnosis     ICD-10-CM    1  Acute pain of right shoulder  M25 511       2  S/P complete repair of rotator cuff  Z98 890                      Subjective: Pt reports that she has been sleeping better but continues with constant 3/10 left anterior shoulder pain  She f/u with MD on 5/10/23  Objective: See treatment diary below  Assessment: Pt with good tolerance to manual techniques with improved mobility with completion  Pt required moderate verbal and manual cues to ensure relaxation with table slides  Will continue to progress program per protocol  Pt will benefit from continued skilled PT intervention in order to address her remaining limitations and to restore maximal function  Plan: Progress treament per protocol        Precautions: RTC Protocol    DOS: 2023     MD Visit: 5/10/23     Daily Treatment Diary      Assessment  3/30  4/7  4/14  4/28               Radha/Tasia RICHARD                     FOTO  42/71       **         **   Manuals    L Shoulder PROM Per Protocol    Akiko Breen MD                L GH Mobs: Dist, Inf    Gr 1-2  MD                      Prescribed POC    Pt Education: Protocol, Sling Position, Activity Modification/Restriction, Cryotherapy, Protocol  MD                      HEP Issued/Updated  MD                      Elbow PROM: Flex/Ext/Pro/Sup  1x10 ea    1x10 ea  HEP                Pendulums: 4 Way  1x10 ea  15x ea  15 ea  x 30 ea                Table Slides: Flex and Scap  NV  10x5\"  10x5\"  10\"x10 ea               Table ER Stretch    10\"x10          Putty     Red 2'  Red 2'  HEP                Scap Retract    10x  2x10  5\"  2x10                Shoulder Isometrics: Flex/ABD/Ext        NV                Supine Wand Press Up        NV                Supine Wand Flexion        NV                S/L ER     NV          Goal " Oriented Functional Activity:                                                Modalities    CP L Shoulder   Post-Tx    pt defers  pt defer  @ home                            QR Code:    [facility]P:

## 2023-05-04 ENCOUNTER — OFFICE VISIT (OUTPATIENT)
Dept: PHYSICAL THERAPY | Facility: REHABILITATION | Age: 68
End: 2023-05-04

## 2023-05-04 DIAGNOSIS — M25.511 ACUTE PAIN OF RIGHT SHOULDER: Primary | ICD-10-CM

## 2023-05-04 DIAGNOSIS — Z98.890 S/P COMPLETE REPAIR OF ROTATOR CUFF: ICD-10-CM

## 2023-05-04 NOTE — PROGRESS NOTES
"Daily Note     Today's date: 2023  Patient name: Eder Hurt  : 1955  MRN: [id number]  Referring provider: Suyapa Vera MD  Dx:   Encounter Diagnosis     ICD-10-CM    1  Acute pain of right shoulder  M25 511       2  S/P complete repair of rotator cuff  Z98 890                      Subjective: Pt reports that she has been doing her exercises every other day, has been very busy with other things  She will start to come twice weekly beginning next week  She has f/u with MD on 5/10/23  Objective: See treatment diary below  Assessment: Pt with good tolerance to manual techniques with improved mobility with completion  Pt with good response to progression of program as listed with appropriate challenge and fatigue  Pt required moderate verbal and manual cues to ensure relaxation with table slides  Will continue to progress program per protocol  Pt will benefit from continued skilled PT intervention in order to address her remaining limitations and to restore maximal function  Plan: Progress treament per protocol        Precautions: RTC Protocol    DOS: 2023     MD Visit: 5/10/23     Daily Treatment Diary      Assessment  3/30  4/7  4/14  4/28  5/4             Radha/Tasia RICHARD                     FOTO  42/71       **         **   Manuals    L Shoulder PROM Per Protocol    Maria Fernanda Gardner MD, MD              L GH Mobs: Dist, Inf    Gr 1-2  MD Gr 1-2  MD                     Prescribed POC    Pt Education: Protocol, Sling Position, Activity Modification/Restriction, Cryotherapy, Protocol  MD                      HEP Issued/Updated  MD                      Elbow PROM: Flex/Ext/Pro/Sup  1x10 ea    1x10 ea  HEP                Pendulums: 4 Way  1x10 ea  15x ea  15 ea  x 30 ea  x 30 ea  HEP            Table Slides: Flex and Scap  NV  10x5\"  10x5\"  10\"x10 ea  10\"x10 ea             Table ER Stretch    10\"x10  10\"x10         Putty     Red 2'  Red 2'  HEP                Scap Retract    10x  2x10  " "5\"  2x10  5\"  2x10             Flexion Wall Slides     R UE Assist  5\" x 5         Shoulder Isometrics: Flex/ABD/Ext        NV  5\"  1x10 ea              Supine Wand Press Up        NV  AAROM w/PT Assist  5\" x 5              Supine Wand Flexion        NV  attempted, not ready              S/L ER     NV  5\"  1x10         Goal Oriented Functional Activity:                                                Modalities    CP L Shoulder   Post-Tx    pt defers  pt defer  @ home  @ home                          QR Code:    [facility] HEP:  Access Code: [email or web address]  URL: [email or web address]  Date: 05/04/2023  Prepared by: Teri Harrell    Exercises  - Isometric Shoulder Flexion at Wall  - 1 x daily - 10 reps - 5 hold  - Standing Isometric Shoulder Abduction with Doorway - Arm Bent  - 1 x daily - 10 reps - 5 hold  - Isometric Shoulder Extension at Wall  - 1 x daily - 10 reps - 5 hold  - Shoulder Flexion Wall Slide with Towel  - 1 x daily - 10 reps - 5 hold     "

## 2023-05-05 ENCOUNTER — APPOINTMENT (OUTPATIENT)
Dept: PHYSICAL THERAPY | Facility: REHABILITATION | Age: 68
End: 2023-05-05
Payer: COMMERCIAL

## 2023-05-11 ENCOUNTER — APPOINTMENT (OUTPATIENT)
Dept: PHYSICAL THERAPY | Facility: REHABILITATION | Age: 68
End: 2023-05-11
Payer: COMMERCIAL

## 2023-05-16 ENCOUNTER — OFFICE VISIT (OUTPATIENT)
Dept: PHYSICAL THERAPY | Facility: REHABILITATION | Age: 68
End: 2023-05-16

## 2023-05-16 DIAGNOSIS — M25.511 ACUTE PAIN OF RIGHT SHOULDER: Primary | ICD-10-CM

## 2023-05-16 DIAGNOSIS — Z98.890 S/P COMPLETE REPAIR OF ROTATOR CUFF: ICD-10-CM

## 2023-05-16 NOTE — PROGRESS NOTES
"Daily Note     Today's date: 2023  Patient name: Clare Polk  : 1955  MRN: [id number]  Referring provider: Yovany Kc MD  Dx:   Encounter Diagnosis     ICD-10-CM    1  Acute pain of right shoulder  M25 511       2  S/P complete repair of rotator cuff  Z98 890                      Subjective: Pt reports having a little muscle soreness upon arrival to treatment due to increased activity with L UE  She states she had a f/u yesterday with the surgeon who is happy with progress of her shoulder thus far  Objective: See treatment diary below  Assessment: Pt with good tolerance to program and progressions performed on this date  Pt demonstrates improved A/AAROM and is able to complete exercises with increased reps and progressions as noted below  Pt with good tolerance to manual techniques performed with decreased soreness and improved mobility noted afterward  Will continue to progress as tolerated and per protocol  Pt would benefit from continued skilled PT to improve current deficits and maximize function  Plan: Progress treament per protocol        Precautions: RTC Protocol    DOS: 2023     MD Visit: 5/10/23     Daily Treatment Diary      Assessment  3/30  4/7  4/14  4/28  5/4  5/16           Eval/Reval  MD                     FOTO  42/71       **  perf       **   Manuals    L Shoulder PROM Per Protocol    Francisco Staples MD, MD  SE            L GH Mobs: Dist, Inf    Gr 1-2  MD Gr 1-2  MD                     Prescribed POC    Pt Education: Protocol, Sling Position, Activity Modification/Restriction, Cryotherapy, Protocol  MD                      HEP Issued/Updated  MD                      Elbow PROM: Flex/Ext/Pro/Sup  1x10 ea    1x10 ea  HEP                Pendulums: 4 Way  1x10 ea  15x ea  15 ea  x 30 ea  x 30 ea  HEP            Table Slides: Flex and Scap  NV  10x5\"  10x5\"  10\"x10 ea  10\"x10 ea 10\"x10 ea           Table ER Stretch    10\"x10  10\"x10 10\"x10        Putty     Red 2'  Red " "2'  HEP                Scap Retract    10x  2x10  5\"  2x10  5\"  2x10  5\"  2x10           Flexion Wall Slides     R UE Assist  5\" x 5 R UE assist  5\"x10        Shoulder Isometrics: Flex/ABD/Ext        NV  5\"  1x10 ea  5\"  1x10 ea            Supine Wand Press Up        NV  AAROM w/PT Assist  5\" x 5  5\"x10            Supine Wand Flexion        NV  attempted, not ready  5\"x10            S/L ER     NV  5\"  1x10 5\"  1x10        Goal Oriented Functional Activity:                                                Modalities    CP L Shoulder   Post-Tx    pt defers  pt defer  @ home  @ home  @ home                        QR Code:    [facility] HEP:  Access Code: [email or web address]  URL: [email or web address]  Date: 05/04/2023  Prepared by: Jason Gillespie    Exercises  - Isometric Shoulder Flexion at Wall  - 1 x daily - 10 reps - 5 hold  - Standing Isometric Shoulder Abduction with Doorway - Arm Bent  - 1 x daily - 10 reps - 5 hold  - Isometric Shoulder Extension at Wall  - 1 x daily - 10 reps - 5 hold  - Shoulder Flexion Wall Slide with Towel  - 1 x daily - 10 reps - 5 hold     "

## 2023-05-19 ENCOUNTER — OFFICE VISIT (OUTPATIENT)
Dept: PHYSICAL THERAPY | Facility: REHABILITATION | Age: 68
End: 2023-05-19

## 2023-05-19 DIAGNOSIS — Z98.890 S/P COMPLETE REPAIR OF ROTATOR CUFF: ICD-10-CM

## 2023-05-19 DIAGNOSIS — M25.511 ACUTE PAIN OF RIGHT SHOULDER: Primary | ICD-10-CM

## 2023-05-19 NOTE — PROGRESS NOTES
"Daily Note     Today's date: 2023  Patient name: Mortimer Bristle  : 1955  MRN: [id number]  Referring provider: Michael Piper MD  Dx:   Encounter Diagnosis     ICD-10-CM    1  Acute pain of right shoulder  M25 511       2  S/P complete repair of rotator cuff  Z98 890                    Subjective: Pt reports that she is overall feeling much better  Objective: See treatment diary below  Assessment: Pt with good tolerance to manual techniques with improved mobility with completion  Pt with good response to progression of program as listed with appropriate challenge and fatigue  Pt required moderate verbal and manual cues to ensure relaxation with table slides  Will continue to progress program per protocol  Pt will benefit from continued skilled PT intervention in order to address her remaining limitations and to restore maximal function  Plan: Progress treament per protocol  Precautions: RTC Protocol    DOS: 2023     Next MD Visit: ??      Daily Treatment Diary      Assessment  3/30  4/7  4/14  4/28  5/4  5/16  5/19         Radha/Tasia RICHARD                     FOTO  42/71       **  perf       **   Manuals    L Shoulder PROM Per Protocol    Rylie Moreno MD, MD  SE  MD          L GH Mobs: Dist, Inf    Gr 1-2  MD Gr 1-2  MD RICHARD                   Prescribed POC    Pt Education: Protocol, Sling Position, Activity Modification/Restriction, Cryotherapy, Protocol  MD                      HEP Issued/Updated  MD                      Elbow PROM: Flex/Ext/Pro/Sup  1x10 ea    1x10 ea  HEP                Pendulums: 4 Way  1x10 ea  15x ea  15 ea  x 30 ea  x 30 ea  HEP            Table Slides: Flex and Scap  NV  10x5\"  10x5\"  10\"x10 ea  10\"x10 ea 10\"x10 ea  10\"x10 ea         Table ER Stretch    10\"x10  10\"x10 10\"x10  10\"x10        Putty     Red 2'  Red 2'  HEP                Scap Retract    10x  2x10  5\"  2x10  5\"  2x10  5\"  2x10   5\"  2x10         Flexion Wall Slides     R UE Assist  5\" x 5 R UE " "assist  5\"x10 R UE assist  5\"x10       Shoulder Isometrics: Flex/ABD/Ext        NV  5\"  1x10 ea  5\"  1x10 ea  5\"  2x10 ea          Supine Wand Press Up        NV  AAROM w/PT Assist  5\" x 5  5\"x10  5\"x10          Supine Wand Flexion        NV  attempted, not ready  5\"x10  NV          S/L ER     NV  5\"  1x10 5\"  1x10 5\"x10       Goal Oriented Functional Activity:                                                Modalities    CP L Shoulder   Post-Tx    pt defers  pt defer  @ home  @ home  @ home  @ home                      QR Code:    [facility]:  Access Code: [email or web address]  URL: [email or web address]  Date: 05/04/2023  Prepared by: Jesica Chirinos    Exercises  - Isometric Shoulder Flexion at Wall  - 1 x daily - 10 reps - 5 hold  - Standing Isometric Shoulder Abduction with Doorway - Arm Bent  - 1 x daily - 10 reps - 5 hold  - Isometric Shoulder Extension at Wall  - 1 x daily - 10 reps - 5 hold  - Shoulder Flexion Wall Slide with Towel  - 1 x daily - 10 reps - 5 hold     "

## 2023-05-23 ENCOUNTER — APPOINTMENT (OUTPATIENT)
Dept: PHYSICAL THERAPY | Facility: REHABILITATION | Age: 68
End: 2023-05-23
Payer: COMMERCIAL

## 2023-05-23 ENCOUNTER — EVALUATION (OUTPATIENT)
Dept: PHYSICAL THERAPY | Facility: REHABILITATION | Age: 68
End: 2023-05-23

## 2023-05-23 DIAGNOSIS — Z98.890 S/P COMPLETE REPAIR OF ROTATOR CUFF: ICD-10-CM

## 2023-05-23 DIAGNOSIS — M25.512 ACUTE PAIN OF LEFT SHOULDER: Primary | ICD-10-CM

## 2023-05-23 NOTE — PROGRESS NOTES
PT Re-Evaluation     Today's date: 23  Patient name: Adolfo Vásquez  : 1955  MRN: [id number]  Referring provider: Pedrito Waldron MD  Dx:   Encounter Diagnosis     ICD-10-CM    1  Acute pain of left shoulder  M25 512       2  S/P complete repair of rotator cuff  Z98 890           Start Time: 1430  Stop Time: 1520  Total time in clinic (min): 50 minutes    Assessment  Assessment details: Pt has been regularly attending PT for 8 sessions and is now 13 weeks s/p L shoulder arthroscopy, SAD , RC repair, debride labrum/ synovium/ chondroplasty, release biceps which was performed on 23  Pt reports overall decrease in pain, and demonstrates improved left shoulder range of motion, strength, and tolerance to activity  Pt will benefit from continued skilled PT intervention in order to address her remaining limitations and to restore maximal function  Impairments: abnormal coordination, abnormal or restricted ROM, activity intolerance, impaired physical strength and pain with function  Understanding of Dx/Px/POC: good   Prognosis: good    Goals  Short-term goals  1  Pt will decrease pain by 1-2 points within 4 weeks  2  Pt will improve ROM to benchmarks as outlined in surgeon's protocol within 4-6 weeks  3  Pt will be independent in donning/doffing sling within 4 weeks  Long-term goals  1  Pt will be independent in HEP by discharge  2  Pt will be able to reach to shoulder-height shelf with minimal discomfort or scapular compensation in 8 weeks  3  Pt will be able to perform IADLS without pain or compensation in 10 weeks  4  Pt will be able to carry household items/grocery bags with <3/10 pain and without compensation in 12 weeks         Plan  Plan details: Advancement of treatment  Patient would benefit from: PT eval and skilled PT  Planned modality interventions: cryotherapy  Planned therapy interventions: IADL retraining, body mechanics training, flexibility, functional ROM exercises, home "exercise program, neuromuscular re-education, manual therapy, postural training, strengthening, stretching, therapeutic activities, therapeutic exercise, activity modification, patient education and self care  Frequency: 2x week  Duration in visits: 20  Duration in weeks: 12        Subjective Evaluation    History of Present Illness  Date of surgery: 2/17/2023  Mechanism of injury: surgery  Mechanism of injury: 5/23/23 Re-Evaluation:     Pt has been regularly attending PT for 8 sessions and is now 13 weeks s/p L shoulder arthroscopy, SAD , RC repair, debride labrum/ synovium/ chondroplasty, release biceps which was performed on 2/17/23  Pt reports overall reduced pain and improved ability to perform ADLs  She reports her home exercises are going well  Pt is a 74year old right-handed female presenting to PT approximately 6 weeks s/p L shoulder arthroscopy, SAD , RC repair, debride labrum/ synovium/ chondroplasty, release biceps  Pt reports she does not recall any specific injury, however, began having pain and difficulty lifting her arm in January  Pt went to Dr Lindsey Templeton for further evaluation, x-ray and MRI performed, positive for RTC tear  Pt underwent surgical procedure as listed above on 2/17/23  Pt placed in standard sling, without abduction pillow  Pt referred to OPPT  Pt is retired, used to work at [employer] as [employer]  Pain Location: left shoulder and upper arm region    Pain Type: constant throbbing, sharp pain with movement    Pain Intensity:  Current: 8  Best: 5  Worst: 8      Pt reports increased pain and/or difficulty with: ADLs, any movement with her left arm  Pt reports she has difficulty falling and staying asleep, states \"I haven't slept for a month  \"    Pt reports decreased pain with: medication, ice            Recurrent probem    Quality of life: good    Treatments  Current treatment: medication  Patient Goals  Patient goals for therapy: decreased pain, increased " "motion, increased strength and independence with ADLs/IADLs          Objective     Passive Range of Motion   Left Shoulder   Flexion: 135 degrees with pain  Abduction: 130 degrees with pain  External rotation 90°: 85 degrees with pain  Internal rotation 90°: 10 degrees with pain    Right Shoulder   Normal passive range of motion    Left Elbow   Normal passive range of motion    Right Elbow   Normal passive range of motion    Strength/Myotome Testing     Left Shoulder     Planes of Motion   Flexion: 2-   Abduction: 2-   External rotation at 0°: 2-   Internal rotation at 0°: 2-     Right Shoulder   Normal muscle strength    Additional Strength Details  L Shoulder Strength assessed via available AROM, no manual resistance applied  Precautions: RTC Protocol  Precautions: RTC Protocol    DOS: 2/17/2023     Next MD Visit: ??      Daily Treatment Diary      Assessment  3/30  4/7  4/14  4/28  5/4  5/16  5/19  5/23       Radha/Tasia RICHARD                     FOTO  42/71       **  perf       **   Manuals    L Shoulder PROM Per Protocol    LH  YASHIRA  MD RICHARD  SE  MD          L GH Mobs: Dist, Inf    Gr 1-2  MD Gr 1-2  MD RICHARD                   Prescribed POC    Pt Education: Protocol, Sling Position, Activity Modification/Restriction, Cryotherapy, Protocol  MD                      HEP Issued/Updated  MD                      Elbow PROM: Flex/Ext/Pro/Sup  1x10 ea    1x10 ea  HEP                Pendulums: 4 Way  1x10 ea  15x ea  15 ea  x 30 ea  x 30 ea  HEP            Table Slides: Flex and Scap  NV  10x5\"  10x5\"  10\"x10 ea  10\"x10 ea 10\"x10 ea  10\"x10 ea  10\"x10 ea       Table ER Stretch    10\"x10  10\"x10 10\"x10  10\"x10  HEP      Putty     Red 2'  Red 2'  HEP                Scap Retract    10x  2x10  5\"  2x10  5\"  2x10  5\"  2x10   5\"  2x10  TB Rows  NV       Flexion Wall Slides     R UE Assist  5\" x 5 R UE assist  5\"x10 R UE assist  5\"x10 R UE assist  5\"x10      Shoulder Isometrics: Flex/ABD/Ext        NV  5\"  1x10 ea  " "5\"  1x10 ea  5\"  2x10 ea  HEP        Supine Wand Press Up        NV  AAROM w/PT Assist  5\" x 5  5\"x10  5\"x10  5\"x10        Supine Wand Flexion        NV  attempted, not ready  5\"x10  NV  5\"x10        S/L ER     NV  5\"  1x10 5\"  1x10 5\"x10  5\"x10      Goal Oriented Functional Activity:                                                Modalities    CP L Shoulder   Post-Tx    pt defers  pt defer  @ home  @ home  @ home  @ home                      QR Code:    [facility]:  Access Code: [email or web address]  URL: [email or web address]  Date: 05/04/2023  Prepared by: Peter Lua    Exercises  - Isometric Shoulder Flexion at Wall  - 1 x daily - 10 reps - 5 hold  - Standing Isometric Shoulder Abduction with Doorway - Arm Bent  - 1 x daily - 10 reps - 5 hold  - Isometric Shoulder Extension at Wall  - 1 x daily - 10 reps - 5 hold  - Shoulder Flexion Wall Slide with Towel  - 1 x daily - 10 reps - 5 hold     "

## 2023-05-24 ENCOUNTER — OFFICE VISIT (OUTPATIENT)
Dept: PHYSICAL THERAPY | Facility: REHABILITATION | Age: 68
End: 2023-05-24

## 2023-05-24 DIAGNOSIS — Z98.890 S/P COMPLETE REPAIR OF ROTATOR CUFF: ICD-10-CM

## 2023-05-24 DIAGNOSIS — M25.511 ACUTE PAIN OF RIGHT SHOULDER: Primary | ICD-10-CM

## 2023-05-24 NOTE — PROGRESS NOTES
"Daily Note     Today's date: 2023  Patient name: Abdias Neumann  : 1955  MRN: [id number]  Referring provider: Ronen Rushing MD  Dx:   Encounter Diagnosis     ICD-10-CM    1  Acute pain of right shoulder  M25 511       2  S/P complete repair of rotator cuff  Z98 890                      Subjective: Pt reports her shoulder is sore after working out in the yard and coming to PT yesterday  She had trouble sleeping last night 2* pain  Objective: See treatment diary below      Assessment: Pt with good tolerance to modified treatment  Held supine wand flexion and press ups 2* c/o increased soreness this visit  Focus of treatment on manual techniques and AAROM with favorable outcome  Pt reports feeling decreased soreness post treatment  Will continue to gradually progress as tolerated  Pt would benefit from continued skilled PT to improve current deficits and maximize function  Plan: Continue per plan of care  Precautions: RTC Protocol  Precautions: RTC Protocol    DOS: 2023     Next MD Visit: ??      Daily Treatment Diary      Assessment  3/30  4/7  4/14  4/28  5/4  5/16  5/19  5/23  5/24     Eval/Revcesia RICHARD                     FOTO  42/71       **  perf       **   Manuals    L Shoulder PROM Per Protocol    LH  YASHIRA  MD RICHARD  SE  MD    SE      L GH Mobs: Dist, Inf    Gr 1-2  MD Gr 1-2  MD RICHARD  SE                 Prescribed POC    Pt Education: Protocol, Sling Position, Activity Modification/Restriction, Cryotherapy, Protocol  MD                      HEP Issued/Updated  MD                      Elbow PROM: Flex/Ext/Pro/Sup  1x10 ea    1x10 ea  HEP                Pendulums: 4 Way  1x10 ea  15x ea  15 ea  x 30 ea  x 30 ea  HEP            Table Slides: Flex and Scap  NV  10x5\"  10x5\"  10\"x10 ea  10\"x10 ea 10\"x10 ea  10\"x10 ea  10\"x10 ea  10\"x10 ea     Table ER Stretch    10\"x10  10\"x10 10\"x10  10\"x10  HEP      Putty     Red 2'  Red 2'  HEP                Scap Retract    10x  2x10  5\"  2x10  " "5\"  2x10  5\"  2x10   5\"  2x10  TB Rows  NV  OTB  5\"  1x10     Flexion Wall Slides     R UE Assist  5\" x 5 R UE assist  5\"x10 R UE assist  5\"x10 R UE assist  5\"x10 R UE  5\"x10     Shoulder Isometrics: Flex/ABD/Ext        NV  5\"  1x10 ea  5\"  1x10 ea  5\"  2x10 ea  HEP        Supine Wand Press Up        NV  AAROM w/PT Assist  5\" x 5  5\"x10  5\"x10  5\"x10  NV      Supine Wand Flexion        NV  attempted, not ready  5\"x10  NV  5\"x10  NV      S/L ER     NV  5\"  1x10 5\"  1x10 5\"x10  5\"x10 5\"x10     Goal Oriented Functional Activity:                                                Modalities    CP L Shoulder   Post-Tx    pt defers  pt defer  @ home  @ home  @ home  @ home  @ home                    QR Code:    [facility]:  Access Code: [email or web address]  URL: [email or web address]  Date: 05/04/2023  Prepared by: Anjali San    Exercises  - Isometric Shoulder Flexion at Wall  - 1 x daily - 10 reps - 5 hold  - Standing Isometric Shoulder Abduction with Doorway - Arm Bent  - 1 x daily - 10 reps - 5 hold  - Isometric Shoulder Extension at Wall  - 1 x daily - 10 reps - 5 hold  - Shoulder Flexion Wall Slide with Towel  - 1 x daily - 10 reps - 5 hold       "

## 2023-05-31 ENCOUNTER — OFFICE VISIT (OUTPATIENT)
Dept: PHYSICAL THERAPY | Facility: REHABILITATION | Age: 68
End: 2023-05-31

## 2023-05-31 DIAGNOSIS — M25.511 ACUTE PAIN OF RIGHT SHOULDER: Primary | ICD-10-CM

## 2023-05-31 DIAGNOSIS — M25.512 ACUTE PAIN OF LEFT SHOULDER: ICD-10-CM

## 2023-05-31 DIAGNOSIS — Z98.890 S/P COMPLETE REPAIR OF ROTATOR CUFF: ICD-10-CM

## 2023-05-31 NOTE — PROGRESS NOTES
"Daily Note     Today's date: 2023  Patient name: Kyleigh Morales  : 1955  MRN: [id number]  Referring provider: Ayaka Petersen MD  Dx:   Encounter Diagnosis     ICD-10-CM    1  Acute pain of right shoulder  M25 511       2  S/P complete repair of rotator cuff  Z98 890       3  Acute pain of left shoulder  M25 512                      Subjective: Pt with no complaints to offer  She states her soreness subsided after lv without any issues  She has a f/u with surgeon on   Objective: See treatment diary below  Updated HEP to include TB rows, pt demonstrates and verbalizes understanding      Assessment: Pt with good tolerance to resuming full treatment on this date  Pt progressing well with ROM and also reports improved tolerance to functional activities at home  Will continue to progress per protocol as pt tolerates in upcoming visits  Pt would benefit from continued skilled PT to improve current deficits and maximize function  Plan: Continue per plan of care  Precautions: RTC Protocol  Precautions: RTC Protocol    DOS: 2023     Next MD Visit: ??      Daily Treatment Diary      Assessment  3/30  4/7  4/14  4/28  5/4  5/16  5/19  5/23  5/24  5/31   Eval/Reval  MD                     FOTO  42/71       **  perf       80/71   Manuals    L Shoulder PROM Per Protocol    Keri Hurley MD, MD, SE, MD    SE  SE    L GH Mobs: Dist, Inf    Gr 1-2  MD Gr 1-2  MD RICHARD  SE SE                Prescribed POC    Pt Education: Protocol, Sling Position, Activity Modification/Restriction, Cryotherapy, Protocol  MD                      HEP Issued/Updated  MD                      Elbow PROM: Flex/Ext/Pro/Sup  1x10 ea    1x10 ea  HEP                Pendulums: 4 Way  1x10 ea  15x ea  15 ea  x 30 ea  x 30 ea  HEP            Table Slides: Flex and Scap  NV  10x5\"  10x5\"  10\"x10 ea  10\"x10 ea 10\"x10 ea  10\"x10 ea  10\"x10 ea  10\"x10 ea  10\"x10 ea   Table ER Stretch    10\"x10  10\"x10 10\"x10  10\"x10  HEP      Putty     " "Red 2'  Red 2'  HEP                Scap Retract    10x  2x10  5\"  2x10  5\"  2x10  5\"  2x10   5\"  2x10  TB Rows  NV  OTB  5\"  1x10 Pink   5\"  2x10   Flexion Wall Slides     R UE Assist  5\" x 5 R UE assist  5\"x10 R UE assist  5\"x10 R UE assist  5\"x10 R UE  5\"x10 R UE  5\"x10    Shoulder Isometrics: Flex/ABD/Ext        NV  5\"  1x10 ea  5\"  1x10 ea  5\"  2x10 ea  HEP        Supine Wand Press Up        NV  AAROM w/PT Assist  5\" x 5  5\"x10  5\"x10  5\"x10  NV  5\"x15    Supine Wand Flexion        NV  attempted, not ready  5\"x10  NV  5\"x10  NV  5\"x15    S/L ER     NV  5\"  1x10 5\"  1x10 5\"x10  5\"x10 5\"x10 5\"x15    Goal Oriented Functional Activity:                                                Modalities    CP L Shoulder   Post-Tx    pt defers  pt defer  @ home  @ home  @ home  @ home  @ home                    QR Code:    [facility] HE[facility]:  Access Code: [email or web address]  URL: [email or web address]  Date: 05/04/2023  Prepared by: Peter Lua    Exercises  - Isometric Shoulder Flexion at Wall  - 1 x daily - 10 reps - 5 hold  - Standing Isometric Shoulder Abduction with Doorway - Arm Bent  - 1 x daily - 10 reps - 5 hold  - Isometric Shoulder Extension at Wall  - 1 x daily - 10 reps - 5 hold  - Shoulder Flexion Wall Slide with Towel  - 1 x daily - 10 reps - 5 hold       "

## 2023-06-07 ENCOUNTER — OFFICE VISIT (OUTPATIENT)
Dept: PHYSICAL THERAPY | Facility: REHABILITATION | Age: 68
End: 2023-06-07
Payer: COMMERCIAL

## 2023-06-07 DIAGNOSIS — Z98.890 S/P COMPLETE REPAIR OF ROTATOR CUFF: ICD-10-CM

## 2023-06-07 DIAGNOSIS — M25.512 ACUTE PAIN OF LEFT SHOULDER: ICD-10-CM

## 2023-06-07 DIAGNOSIS — M25.511 ACUTE PAIN OF RIGHT SHOULDER: Primary | ICD-10-CM

## 2023-06-07 PROCEDURE — 97140 MANUAL THERAPY 1/> REGIONS: CPT

## 2023-06-07 PROCEDURE — 97110 THERAPEUTIC EXERCISES: CPT

## 2023-06-07 PROCEDURE — 97112 NEUROMUSCULAR REEDUCATION: CPT

## 2023-06-07 NOTE — PROGRESS NOTES
"Daily Note     Today's date: 2023  Patient name: Tatyana Haider  : 1955  MRN: [id number]  Referring provider: Mervin Bauman MD  Dx:   Encounter Diagnosis     ICD-10-CM    1  Acute pain of right shoulder  M25 511       2  S/P complete repair of rotator cuff  Z98 890       3  Acute pain of left shoulder  M25 512                      Subjective: Pt reports she was cleaning out her son's barn over the weekend and doing a little lifting and pushing  She denies increased pain, but notes some soreness afterward  Objective: See treatment diary below      Assessment: Pt with good tolerance to progression of program as listed below  Appropriate challenge and fatigue noted with exercises without increased pain  Pt requires occasional verbal and manual cues for proper positioning and performance of exercises  Good tolerance to manual techniques with improving flexion and ER PROM  Pt would benefit from continued skilled PT to improve current deficits and maximize function  Plan: Continue per plan of care  Precautions: RTC Protocol  Precautions: RTC Protocol    DOS: 2023     Next MD Visit: ??      Daily Treatment Diary      Assessment    Eval/Reval                      FOTO        **  perf       80/71   Manuals    L Shoulder PROM Per Protocol  SE Rita Levy MD, MD, SE, MD    SE  SE    L GH Mobs: Dist, Inf SE   Gr 1-2  MD Gr 1-2  MD MD SE VELAZQUEZ                Prescribed POC    Pt Education: Protocol, Sling Position, Activity Modification/Restriction, Cryotherapy, Protocol                       HEP Issued/Updated                       Elbow PROM: Flex/Ext/Pro/Sup     1x10 ea  HEP                Pendulums: 4 Way   15x ea  15 ea  x 30 ea  x 30 ea  HEP            Table Slides: Flex and Scap 10\"x10 ea  10x5\"  10x5\"  10\"x10 ea  10\"x10 ea 10\"x10 ea  10\"x10 ea  10\"x10 ea  10\"x10 ea  10\"x10 ea   Table ER Stretch    10\"x10  10\"x10 10\"x10  10\"x10  HEP      " "Putty     Red 2'  Red 2'  HEP                Scap Retract  pink  5\"  2x10    Sh ext  OTB  5\" 1x10  10x  2x10  5\"  2x10  5\"  2x10  5\"  2x10   5\"  2x10  TB Rows  NV  OTB  5\"  1x10 Pink   5\"  2x10   Flexion Wall Slides 5\"x10    R UE Assist  5\" x 5 R UE assist  5\"x10 R UE assist  5\"x10 R UE assist  5\"x10 R UE  5\"x10 R UE  5\"x10    Shoulder Isometrics: Flex/ABD/Ext        NV  5\"  1x10 ea  5\"  1x10 ea  5\"  2x10 ea  HEP        Supine Wand Press Up  5\"  2x10      NV  AAROM w/PT Assist  5\" x 5  5\"x10  5\"x10  5\"x10  NV  5\"x15    Supine Wand Flexion  5\"  2x10      NV  attempted, not ready  5\"x10  NV  5\"x10  NV  5\"x15    S/L ER 5\"  2x10    NV  5\"  1x10 5\"  1x10 5\"x10  5\"x10 5\"x10 5\"x15    Goal Oriented Functional Activity:                                                Modalities    CP L Shoulder   Post-Tx    pt defers  pt defer  @ home  @ home  @ home  @ home  @ home                    QR Code:    [facility]:  Access Code: [email or web address]  URL: [email or web address]  Date: 05/04/2023  Prepared by: Asim Early    Exercises  - Isometric Shoulder Flexion at Wall  - 1 x daily - 10 reps - 5 hold  - Standing Isometric Shoulder Abduction with Doorway - Arm Bent  - 1 x daily - 10 reps - 5 hold  - Isometric Shoulder Extension at Wall  - 1 x daily - 10 reps - 5 hold  - Shoulder Flexion Wall Slide with Towel  - 1 x daily - 10 reps - 5 hold       "

## 2023-06-14 ENCOUNTER — OFFICE VISIT (OUTPATIENT)
Dept: PHYSICAL THERAPY | Facility: REHABILITATION | Age: 68
End: 2023-06-14
Payer: COMMERCIAL

## 2023-06-14 DIAGNOSIS — M25.512 ACUTE PAIN OF LEFT SHOULDER: ICD-10-CM

## 2023-06-14 DIAGNOSIS — M25.511 ACUTE PAIN OF RIGHT SHOULDER: Primary | ICD-10-CM

## 2023-06-14 DIAGNOSIS — Z98.890 S/P COMPLETE REPAIR OF ROTATOR CUFF: ICD-10-CM

## 2023-06-14 PROCEDURE — 97140 MANUAL THERAPY 1/> REGIONS: CPT | Performed by: PHYSICAL THERAPIST

## 2023-06-14 PROCEDURE — 97110 THERAPEUTIC EXERCISES: CPT | Performed by: PHYSICAL THERAPIST

## 2023-06-14 PROCEDURE — 97112 NEUROMUSCULAR REEDUCATION: CPT | Performed by: PHYSICAL THERAPIST

## 2023-06-14 NOTE — PROGRESS NOTES
"Daily Note     Today's date: 2023  Patient name: Henry Cao  : 1955  MRN: [id number]  Referring provider: Malgorzata Herrera MD  Dx:   Encounter Diagnosis     ICD-10-CM    1  Acute pain of right shoulder  M25 511       2  S/P complete repair of rotator cuff  Z98 890       3  Acute pain of left shoulder  M25 512                      Subjective: Pt reports on Saturday 6/10/23 she lifted a case of water (40) onto her cart and then into her car and felt moderate increase in left shoulder pain  She reports continued soreness into today  Objective: See treatment diary below  Assessment: No progressions made this session secondary to patient's c/o increased shoulder pain prior to session  Pt required minimal verbal and manual cues for proper positioning and performance of all exercises today  Pt with good tolerance to manual techniques without adverse response, gradual improvement in L shoulder joint mobility and PROM  Pt will benefit from continued skilled PT intervention in order to address her remaining limitations and to restore maximal function  Plan: Continue per plan of care          Precautions: RTC Protocol    DOS: 2023     Next MD Visit: 23      Daily Treatment Diary      Assessment    Eval/Reval              FOTO          80/71   Manuals    L Shoulder PROM Per Protocol  SE MD         SE    L GH Mobs: Olena Brittle MD        SE                Prescribed POC    Pt Education: Protocol, Sling Position, Activity Modification/Restriction, Cryotherapy, Protocol               HEP Issued/Updated               Elbow PROM: Flex/Ext/Pro/Sup               Pendulums: 4 Way               Table Slides: Flex and Scap 10\"x10 ea 10\"x10 ea         10\"x10 ea    Scap Retract  pink  5\"  2x10    Sh ext  OTB  5\" 1x10         Pink   5\"  2x10   Flexion Wall Slides 5\"x10         R UE  5\"x10    Shoulder Isometrics: Flex/ABD/Ext                Supine Wand Press Up  5\"  2x10 5\"  2x10    " "     5\"x15    Supine Wand Flexion  5\"  2x10 5\"  2x10         5\"x15    S/L ER 5\"  2x10 5\"  2x10        5\"x15   TB Rows  Pink  5\"  1x10           TB Ext  Orange  5\"  2x10                         Goal Oriented Functional Activity:                                Modalities    CP L Shoulder   Post-Tx   @home                         QR Code:    [facility]:  Access Code: [email or web address]  URL: [email or web address]  Date: 05/04/2023  Prepared by: Gabe Gallegos    Exercises  - Isometric Shoulder Flexion at Wall  - 1 x daily - 10 reps - 5 hold  - Standing Isometric Shoulder Abduction with Doorway - Arm Bent  - 1 x daily - 10 reps - 5 hold  - Isometric Shoulder Extension at Wall  - 1 x daily - 10 reps - 5 hold  - Shoulder Flexion Wall Slide with Towel  - 1 x daily - 10 reps - 5 hold       "

## 2023-08-02 ENCOUNTER — OFFICE VISIT (OUTPATIENT)
Dept: URGENT CARE | Age: 68
End: 2023-08-02
Payer: COMMERCIAL

## 2023-08-02 VITALS
SYSTOLIC BLOOD PRESSURE: 122 MMHG | DIASTOLIC BLOOD PRESSURE: 78 MMHG | RESPIRATION RATE: 18 BRPM | OXYGEN SATURATION: 98 % | TEMPERATURE: 97.8 F | HEART RATE: 84 BPM

## 2023-08-02 DIAGNOSIS — R05.1 ACUTE COUGH: ICD-10-CM

## 2023-08-02 DIAGNOSIS — U07.1 COVID-19: Primary | ICD-10-CM

## 2023-08-02 LAB
SARS-COV-2 AG UPPER RESP QL IA: POSITIVE
VALID CONTROL: ABNORMAL

## 2023-08-02 PROCEDURE — 87811 SARS-COV-2 COVID19 W/OPTIC: CPT

## 2023-08-02 PROCEDURE — 99213 OFFICE O/P EST LOW 20 MIN: CPT

## 2023-08-02 NOTE — PATIENT INSTRUCTIONS
Rapid COVID testing positive. Discussed OTC meds & supportive care. Discussed current CDC guidelines. Discussed follow up with PCP. Proceed to ER if symptoms worsen.

## 2023-08-02 NOTE — PROGRESS NOTES
[facility]        NAME: Donald Todd is a 74 y.o. female  : 1955    MRN: [id number]  DATE: 2023  TIME: 10:18 AM    Assessment and Plan   COVID-19 [U07.1]  1. COVID-19        2. Acute cough  Poct Covid 19 Rapid Antigen Test            Patient Instructions     Rapid COVID positive. Discussed current CDC guidelines. Patient on day 5 of symptoms. Discussed OTC meds & supportive care. Discussed follow up with PCP. Follow up with PCP in 2-3 days. Proceed to ER if symptoms worsen. Chief Complaint     Chief Complaint   Patient presents with   • Cold Like Symptoms     Patient relates having cold symptoms for over a week. States has been using OTC cough medications that are not helping. Patient states she has been coughing up green mucus. Denies sore throat or congestion         History of Present Illness     74 y.o. F  Cough & congestion x 5 days  Denies CP or SOB  No fevers or chills  Mucinex DM & Delsym OTC  Denies sick contacts or recent travel  Vaccinated against COVID    Review of Systems   Review of Systems   Constitutional: Negative for chills, fatigue and fever. HENT: Positive for congestion. Negative for ear pain, facial swelling, hearing loss, rhinorrhea, sinus pressure, sneezing, sore throat and trouble swallowing. Eyes: Negative for pain, redness and visual disturbance. Respiratory: Positive for cough. Negative for chest tightness, shortness of breath and wheezing. Cardiovascular: Negative for chest pain and palpitations. Gastrointestinal: Negative for abdominal pain, diarrhea, nausea and vomiting. Genitourinary: Negative for dysuria, flank pain, hematuria and pelvic pain. Musculoskeletal: Negative for arthralgias, back pain and myalgias. Skin: Negative for color change and rash. Neurological: Negative for dizziness, seizures, syncope, weakness, light-headedness and headaches.    Psychiatric/Behavioral: Negative for confusion, hallucinations and sleep disturbance. The patient is not nervous/anxious. All other systems reviewed and are negative. Current Medications       Current Outpatient Medications:   •  Biotin 10 MG TABS, Take by mouth, Disp: , Rfl:   •  Nutritional Supplements (VITAMIN D BOOSTER PO), Take by mouth, Disp: , Rfl:   •  Omega-3 Fatty Acids (FISH OIL PO), Take 2 g by mouth, Disp: , Rfl:   •  vitamin E 100 UNIT capsule, Take 100 Units by mouth daily, Disp: , Rfl:   •  zinc gluconate 50 mg tablet, Take 50 mg by mouth daily, Disp: , Rfl:   •  gentamicin (GARAMYCIN) 0.3 % ophthalmic solution, Administer 1 drop to the right eye every 4 (four) hours (Patient not taking: Reported on 8/2/2023), Disp: 5 mL, Rfl: 0  •  methocarbamol (ROBAXIN) 500 mg tablet, Take 500 mg by mouth 4 (four) times a day (Patient not taking: Reported on 8/2/2023), Disp: , Rfl:     Current Allergies     Allergies as of 08/02/2023   • (No Known Allergies)            The following portions of the patient's history were reviewed and updated as appropriate: allergies, current medications, past family history, past medical history, past social history, past surgical history and problem list.     Past Medical History:   Diagnosis Date   • Hypercholesteremia        Past Surgical History:   Procedure Laterality Date   • FOOT SURGERY     • SHOULDER SURGERY         Family History   Problem Relation Age of Onset   • Heart disease Mother    • Hypertension Mother    • Ovarian cancer Mother    • Heart attack Father    • No Known Problems Sister    • No Known Problems Daughter    • No Known Problems Maternal Grandmother    • No Known Problems Maternal Grandfather    • No Known Problems Paternal Grandmother    • No Known Problems Paternal Grandfather    • No Known Problems Sister    • No Known Problems Paternal Aunt    • No Known Problems Paternal Aunt    • No Known Problems Paternal Aunt    • No Known Problems Son          Medications have been verified.         Objective   /78 Pulse 84   Temp 97.8 °F (36.6 °C)   Resp 18   SpO2 98%   No LMP recorded. Patient is postmenopausal.       Physical Exam     Physical Exam  Vitals reviewed. Constitutional:       General: She is not in acute distress. Appearance: Normal appearance. She is not toxic-appearing. HENT:      Head: Normocephalic. Right Ear: Tympanic membrane normal. Tympanic membrane is not erythematous or bulging. Left Ear: Tympanic membrane normal. Tympanic membrane is not erythematous or bulging. Nose: No congestion or rhinorrhea. Right Sinus: No maxillary sinus tenderness or frontal sinus tenderness. Left Sinus: No maxillary sinus tenderness or frontal sinus tenderness. Mouth/Throat:      Pharynx: Uvula midline. No oropharyngeal exudate, posterior oropharyngeal erythema or uvula swelling. Tonsils: No tonsillar exudate or tonsillar abscesses. Eyes:      Extraocular Movements: Extraocular movements intact. Conjunctiva/sclera: Conjunctivae normal.      Pupils: Pupils are equal, round, and reactive to light. Cardiovascular:      Rate and Rhythm: Normal rate and regular rhythm. Pulses: Normal pulses. Heart sounds: Normal heart sounds. Pulmonary:      Effort: Pulmonary effort is normal. No tachypnea, accessory muscle usage or respiratory distress. Breath sounds: Normal breath sounds and air entry. No decreased air movement. No decreased breath sounds, wheezing, rhonchi or rales. Comments:   CTAB no wheezing or stridor  No cough on exam  98% RA  Abdominal:      General: Bowel sounds are normal.      Palpations: Abdomen is soft. Tenderness: There is no abdominal tenderness. There is no right CVA tenderness or guarding. Musculoskeletal:         General: Normal range of motion. Cervical back: Normal range of motion. Lymphadenopathy:      Cervical: No cervical adenopathy. Skin:     General: Skin is warm and dry.    Neurological:      General: No focal deficit present. Mental Status: She is alert. Sensory: Sensation is intact. Motor: Motor function is intact. Coordination: Coordination is intact. Gait: Gait is intact. Deep Tendon Reflexes: Reflexes are normal and symmetric.

## 2024-01-04 ENCOUNTER — OFFICE VISIT (OUTPATIENT)
Dept: OBGYN CLINIC | Facility: OTHER | Age: 69
End: 2024-01-04
Payer: COMMERCIAL

## 2024-01-04 ENCOUNTER — APPOINTMENT (OUTPATIENT)
Dept: RADIOLOGY | Facility: OTHER | Age: 69
End: 2024-01-04
Payer: COMMERCIAL

## 2024-01-04 VITALS
SYSTOLIC BLOOD PRESSURE: 150 MMHG | HEART RATE: 81 BPM | DIASTOLIC BLOOD PRESSURE: 91 MMHG | BODY MASS INDEX: 25.61 KG/M2 | WEIGHT: 150 LBS | HEIGHT: 64 IN

## 2024-01-04 DIAGNOSIS — M17.11 PRIMARY OSTEOARTHRITIS OF RIGHT KNEE: Primary | ICD-10-CM

## 2024-01-04 DIAGNOSIS — M25.561 RIGHT KNEE PAIN, UNSPECIFIED CHRONICITY: ICD-10-CM

## 2024-01-04 PROCEDURE — 99204 OFFICE O/P NEW MOD 45 MIN: CPT | Performed by: FAMILY MEDICINE

## 2024-01-04 PROCEDURE — 73562 X-RAY EXAM OF KNEE 3: CPT

## 2024-01-04 NOTE — PROGRESS NOTES
"1. Primary osteoarthritis of right knee        2. Right knee pain, unspecified chronicity  CANCELED: XR knee 4+ vw right injury        No orders of the defined types were placed in this encounter.       IMAGING STUDIES: (I personally reviewed images in PACS and report):  Xray right knee 1/4/24:  Right Knee mild to mod PFOA and mild medial OA      PAST REPORTS:        ASSESSMENT/PLAN:  Chronic Right Knee mild to mod PFOA and mild medial OA  Declines CSI      Repeat X-ray next visit: None    Return if symptoms worsen or fail to improve.    Patient instructions below verbally summarized in person during encounter:  Patient Instructions   Treatment for knee osteoarthritis depends on severity of cartilage wear and pain levels. First line for mild arthritis is exercise to strengthen the knee and allow better joint movement, 5-10% weight loss if overweight, and topical anti-inflammatories such as diclofenac gel or topical analgesic pain relief creams such as capsaicin. Symptoms at this stage usually improve in 3 months.    Moderate to severe arthritis or arthritis not responding to first line treatments may require oral medicine such as anti-inflammatories (NSAIDs) such as ibuprofen/motrin, and if failing treatment with oral NSAIDs, then may consider depression medicines such as duloxetine (cymbalta) which also have been shown to work on pain receptors and help to control pain. Other analgesics such as tylenol (acetaminophen) may be used but do not appear to offer significant pain relief.     Supplements such as glucosamine and chondroitin supplements. Some studies do show benefit from taking glucosamine sulfate (1500 mg/day) and chondroitin (800 mg/day) but evidence is controversial. I recommend against a type of glucosamine known as \"glucosamine hydrochloride\" which appears not as effective as glucosamine sulfate.    If no improvement with oral medicines, then patients may consider steroid injection into the knee joint " which provides pain relief. Steroid injections can be given every 3 months. Any sooner than that can  result in possible deterioration or cartilage in your joint.     Other injections are available such as as viscosupplementation or gel shots into the knee which provide nutrients for the cartilage and can result in pain reduction.  These viscosupplementation injections are generally considered every 6 months but are controversial.  The American Orthopedic Society recommends against viscosupplementation injection; however, the [employer] recommends for these injections.     Beyond these injections there are other controversial treatments including stem cell as well as platelet rich plasma injection which are out of pocket usually up to a 1000 dollars per injection.    Lastly, if you fail conservative measures and have persistent pain, then we may consider referral to surgeon for knee replacement.  (TATI Mccall 2018).      I notified of elevated blood pressure which can be a sign of new hypertension or undiagnosed hypertension. I reviewed risks of elevated blood pressure including heart attack and stroke. I recommended go to ER if develops any headaches, change in vision, chest pain, SOB/trouble breathing. I instructed to follow-up with PCP and/or cardiologist for further evaluation. Patient expressed understanding and agreed to plan.           __________________________________________________________________________    HISTORY OF PRESENT ILLNESS:    Patient presents with knee pain. History, examination, and x-rays are consistent with diagnosis of Osteoarthritis. Atraumatic anterior mild knee pain. Previously moderate pain.     Nonpharmacologic treatment: home exercise program placed on chart  Pain Score:   4/10  Pain from condition does interfere with activities of daily living  Previous Visco relief: not done  Previous CSI relief: not done            Review of Systems      Following  "history reviewed and update:    Past Medical History:   Diagnosis Date    Hypercholesteremia      Past Surgical History:   Procedure Laterality Date    FOOT SURGERY      SHOULDER SURGERY       Social History   Social History     Substance and Sexual Activity   Alcohol Use Not Currently    Comment: occ     Social History     Substance and Sexual Activity   Drug Use No     Social History     Tobacco Use   Smoking Status Never   Smokeless Tobacco Never     Family History   Problem Relation Age of Onset    Heart disease Mother     Hypertension Mother     Ovarian cancer Mother     Heart attack Father     No Known Problems Sister     No Known Problems Daughter     No Known Problems Maternal Grandmother     No Known Problems Maternal Grandfather     No Known Problems Paternal Grandmother     No Known Problems Paternal Grandfather     No Known Problems Sister     No Known Problems Paternal Aunt     No Known Problems Paternal Aunt     No Known Problems Paternal Aunt     No Known Problems Son      No Known Allergies       Physical Exam  /91 (BP Location: Left arm, Patient Position: Sitting, Cuff Size: Standard)   Pulse 81   Ht 5' 4\" (1.626 m)   Wt 68 kg (150 lb)   BMI 25.75 kg/m²     Constitutional:  see vital signs  Gen: well-developed, normocephalic/atraumatic, well-groomed  Eyes: No inflammation or discharge of conjunctiva or lids; sclera clear   Pharynx: no inflammation, lesion, or mass of lips  Neck: supple, no masses, non-distended  MSK: no inflammation, lesion, mass, or clubbing of nails and digits except for other than mentioned below  SKIN: no visible rashes or skin lesions  Pulmonary/Chest: Effort normal. No respiratory distress.   NEURO: cranial nerves grossly intact  PSYCH:  Alert and oriented to person, place, and time; recent and remote memory intact; mood normal, no depression, anxiety, or agitation, judgment and insight good and intact     Ortho Exam  RIGHT KNEE:  Erythema: no  Swelling: " no  Increased Warmth: no  Tenderness: +minimal mjl  Flexion: seated at 90  Extension: intact  Drawer: negative  Varus laxity: negative  Valgus laxity: negative    __________________________________________________________________________  Procedures

## 2024-01-04 NOTE — PATIENT INSTRUCTIONS
"Treatment for knee osteoarthritis depends on severity of cartilage wear and pain levels. First line for mild arthritis is exercise to strengthen the knee and allow better joint movement, 5-10% weight loss if overweight, and topical anti-inflammatories such as diclofenac gel or topical analgesic pain relief creams such as capsaicin. Symptoms at this stage usually improve in 3 months.    Moderate to severe arthritis or arthritis not responding to first line treatments may require oral medicine such as anti-inflammatories (NSAIDs) such as ibuprofen/motrin, and if failing treatment with oral NSAIDs, then may consider depression medicines such as duloxetine (cymbalta) which also have been shown to work on pain receptors and help to control pain. Other analgesics such as tylenol (acetaminophen) may be used but do not appear to offer significant pain relief.     Supplements such as glucosamine and chondroitin supplements. Some studies do show benefit from taking glucosamine sulfate (1500 mg/day) and chondroitin (800 mg/day) but evidence is controversial. I recommend against a type of glucosamine known as \"glucosamine hydrochloride\" which appears not as effective as glucosamine sulfate.    If no improvement with oral medicines, then patients may consider steroid injection into the knee joint which provides pain relief. Steroid injections can be given every 3 months. Any sooner than that can  result in possible deterioration or cartilage in your joint.     Other injections are available such as as viscosupplementation or gel shots into the knee which provide nutrients for the cartilage and can result in pain reduction.  These viscosupplementation injections are generally considered every 6 months but are controversial.  The American [employer] Society recommends against viscosupplementation injection; however, the [employer] recommends for these injections.     Beyond these injections there " are other controversial treatments including stem cell as well as platelet rich plasma injection which are out of pocket usually up to a 1000 dollars per injection.    Lastly, if you fail conservative measures and have persistent pain, then we may consider referral to surgeon for knee replacement.  (TATI Mccall 2018).      I notified of elevated blood pressure which can be a sign of new hypertension or undiagnosed hypertension. I reviewed risks of elevated blood pressure including heart attack and stroke. I recommended go to ER if develops any headaches, change in vision, chest pain, SOB/trouble breathing. I instructed to follow-up with PCP and/or cardiologist for further evaluation. Patient expressed understanding and agreed to plan.

## 2024-02-09 ENCOUNTER — OFFICE VISIT (OUTPATIENT)
Dept: URGENT CARE | Age: 69
End: 2024-02-09
Payer: COMMERCIAL

## 2024-02-09 VITALS
HEART RATE: 86 BPM | SYSTOLIC BLOOD PRESSURE: 158 MMHG | TEMPERATURE: 96.3 F | DIASTOLIC BLOOD PRESSURE: 82 MMHG | OXYGEN SATURATION: 97 % | RESPIRATION RATE: 18 BRPM

## 2024-02-09 DIAGNOSIS — J06.9 VIRAL URI WITH COUGH: Primary | ICD-10-CM

## 2024-02-09 PROCEDURE — 99213 OFFICE O/P EST LOW 20 MIN: CPT | Performed by: EMERGENCY MEDICINE

## 2024-02-09 RX ORDER — ATORVASTATIN CALCIUM 10 MG/1
10 TABLET, FILM COATED ORAL DAILY
COMMUNITY
Start: 2023-10-09

## 2024-02-09 NOTE — PROGRESS NOTES
"  [facility]        NAME: Mily Dickinson is a 68 y.o. female  : 1955    MRN: [id number]  DATE: 2024  TIME: 1:54 PM    Assessment and Plan   Viral URI with cough [J06.9]  1. Viral URI with cough          History and physical examination consistent with viral URI/sinusitis.  Patient symptoms have been ongoing for 5 days and there is no endorsed fever or sputum production.  On examination her lungs are clear to auscultation, she is afebrile, nontoxic-appearing and otherwise hemodynamically stable and in no acute respiratory or other distress.  Patient is very upset that she would not be receiving antibiotics and repeatedly demanded a prescription for a \"Z-Howard\" because she feels over-the-counter medications are not working.  I attempted to  patient on additional over-the-counter treatment modalities she may try such as Afrin nasal spray, throat lozenges and honey which patient did not wish to try.  She repeatedly demanded antibiotics for her symptoms.  I advised patient that I do not feel that her symptoms are secondary to a bacterial process such as atypical pneumonia, pertussis or bacterial rhinosinusitis given length of symptoms, absence of fever and clear lung sounds.  Patient was unsatisfied with this answer and left the room in the midst of counseling her on additional over-the-counter treatment modalities she may try.  I do not feel that exposing patient to the risks of unnecessary antibiotic use to include allergic reaction, diarrhea and C. difficile is warranted for what is likely a viral process.  Advised patient that she may return anytime if her symptoms worsen, she develops fever or has any change in her condition for reevaluation.  Patient Instructions       Follow up with PCP in 3-5 days.  Proceed to  ER if symptoms worsen.    Chief Complaint     Chief Complaint   Patient presents with    Cough     Cough, green mucous, sore throat, Sx for one week. Otc mucinex D, " "sudafed.          History of Present Illness       68-year-old female with history of hearing of hyperlipidemia presents with a chief complaint of nasal congestion, dry nonproductive cough and bilateral frontal maxillary sinus pressure which began 5 days ago.  She denies fever, chest pain, shortness of breath, sputum production, abdominal pain or nausea vomiting or diarrhea.  States she has tried taking over-the-counter cold and flu medication such as Mucinex DM, Sudafed and Robitussin without much relief.  She also endorses a bifrontal headache without associated visual or speech changes, dizziness, or focal numbness tingling or weakness in her extremities.  Patient presents demanding a \"Z-Howard\" to \"knock this out of me\".    URI   This is a new problem. The current episode started in the past 7 days. The problem has been waxing and waning. There has been no fever. Associated symptoms include congestion, coughing, rhinorrhea and sinus pain. Pertinent negatives include no abdominal pain, chest pain, diarrhea, dysuria, ear pain, headaches, joint pain, joint swelling, nausea, neck pain, plugged ear sensation, rash, sneezing, sore throat, swollen glands, vomiting or wheezing. She has tried decongestant for the symptoms. The treatment provided no relief.       Review of Systems   Review of Systems   Constitutional:  Negative for activity change, appetite change, chills, diaphoresis, fatigue and fever.   HENT:  Positive for congestion, postnasal drip, rhinorrhea, sinus pressure and sinus pain. Negative for dental problem, drooling, ear discharge, ear pain, facial swelling, hearing loss, mouth sores, nosebleeds, sneezing, sore throat, tinnitus, trouble swallowing and voice change.    Eyes:  Negative for pain and visual disturbance.   Respiratory:  Positive for cough. Negative for apnea, choking, chest tightness, shortness of breath, wheezing and stridor.    Cardiovascular:  Negative for chest pain and palpitations. "   Gastrointestinal:  Negative for abdominal pain, diarrhea, nausea and vomiting.   Genitourinary:  Negative for dysuria and hematuria.   Musculoskeletal:  Negative for arthralgias, back pain, joint pain and neck pain.   Skin:  Negative for color change and rash.   Neurological:  Negative for dizziness, tremors, seizures, syncope, facial asymmetry, speech difficulty, weakness, light-headedness, numbness and headaches.   All other systems reviewed and are negative.        Current Medications       Current Outpatient Medications:     atorvastatin (LIPITOR) 10 mg tablet, Take 10 mg by mouth daily, Disp: , Rfl:     Biotin 10 MG TABS, Take by mouth, Disp: , Rfl:     Nutritional Supplements (VITAMIN D BOOSTER PO), Take by mouth, Disp: , Rfl:     Omega-3 Fatty Acids (FISH OIL PO), Take 2 g by mouth, Disp: , Rfl:     vitamin E 100 UNIT capsule, Take 100 Units by mouth daily, Disp: , Rfl:     zinc gluconate 50 mg tablet, Take 50 mg by mouth daily, Disp: , Rfl:     gentamicin (GARAMYCIN) 0.3 % ophthalmic solution, Administer 1 drop to the right eye every 4 (four) hours (Patient not taking: Reported on 8/2/2023), Disp: 5 mL, Rfl: 0    methocarbamol (ROBAXIN) 500 mg tablet, Take 500 mg by mouth 4 (four) times a day (Patient not taking: Reported on 8/2/2023), Disp: , Rfl:     Current Allergies     Allergies as of 02/09/2024    (No Known Allergies)            The following portions of the patient's history were reviewed and updated as appropriate: allergies, current medications, past family history, past medical history, past social history, past surgical history and problem list.     Past Medical History:   Diagnosis Date    Hypercholesteremia        Past Surgical History:   Procedure Laterality Date    FOOT SURGERY      SHOULDER SURGERY         Family History   Problem Relation Age of Onset    Heart disease Mother     Hypertension Mother     Ovarian cancer Mother     Heart attack Father     No Known Problems Sister     No Known  Problems Daughter     No Known Problems Maternal Grandmother     No Known Problems Maternal Grandfather     No Known Problems Paternal Grandmother     No Known Problems Paternal Grandfather     No Known Problems Sister     No Known Problems Paternal Aunt     No Known Problems Paternal Aunt     No Known Problems Paternal Aunt     No Known Problems Son          Medications have been verified.        Objective   /82   Pulse 86   Temp (!) 96.3 °F (35.7 °C) (Tympanic)   Resp 18   SpO2 97%   No LMP recorded. Patient is postmenopausal.       Physical Exam     Physical Exam  Vitals and nursing note reviewed.   Constitutional:       General: She is not in acute distress.     Appearance: Normal appearance. She is normal weight. She is not ill-appearing, toxic-appearing or diaphoretic.   HENT:      Head: Normocephalic and atraumatic.      Right Ear: Tympanic membrane, ear canal and external ear normal. There is no impacted cerumen.      Left Ear: Tympanic membrane, ear canal and external ear normal. There is no impacted cerumen.      Nose: Congestion and rhinorrhea present.      Mouth/Throat:      Mouth: Mucous membranes are moist.      Pharynx: Posterior oropharyngeal erythema present. No oropharyngeal exudate.   Eyes:      General: No scleral icterus.        Right eye: No discharge.         Left eye: No discharge.      Extraocular Movements: Extraocular movements intact.      Conjunctiva/sclera: Conjunctivae normal.      Pupils: Pupils are equal, round, and reactive to light.   Neck:      Vascular: No carotid bruit.   Cardiovascular:      Rate and Rhythm: Regular rhythm.      Pulses: Normal pulses.      Heart sounds: Normal heart sounds.   Pulmonary:      Effort: Pulmonary effort is normal. No respiratory distress.      Breath sounds: Normal breath sounds. No stridor. No wheezing, rhonchi or rales.   Chest:      Chest wall: No tenderness.   Abdominal:      General: There is no distension.      Palpations: There is  no mass.      Tenderness: There is no abdominal tenderness. There is no right CVA tenderness, left CVA tenderness, guarding or rebound.      Hernia: No hernia is present.   Musculoskeletal:      Cervical back: Normal range of motion and neck supple. No rigidity or tenderness.   Lymphadenopathy:      Cervical: No cervical adenopathy.   Skin:     Coloration: Skin is not jaundiced or pale.      Findings: No bruising, erythema, lesion or rash.   Neurological:      General: No focal deficit present.      Mental Status: She is alert and oriented to person, place, and time.      Cranial Nerves: No cranial nerve deficit.      Sensory: No sensory deficit.      Motor: No weakness.      Coordination: Coordination normal.      Gait: Gait normal.   Psychiatric:         Mood and Affect: Mood normal.         Behavior: Behavior normal.